# Patient Record
Sex: MALE | Race: BLACK OR AFRICAN AMERICAN
[De-identification: names, ages, dates, MRNs, and addresses within clinical notes are randomized per-mention and may not be internally consistent; named-entity substitution may affect disease eponyms.]

---

## 2017-06-06 ENCOUNTER — HOSPITAL ENCOUNTER (INPATIENT)
Dept: HOSPITAL 68 - ERH | Age: 57
LOS: 6 days | Discharge: HOME HEALTH | DRG: 282 | End: 2017-06-12
Attending: INTERNAL MEDICINE | Admitting: INTERNAL MEDICINE
Payer: COMMERCIAL

## 2017-06-06 VITALS — WEIGHT: 145 LBS | HEIGHT: 68 IN | BODY MASS INDEX: 21.98 KG/M2

## 2017-06-06 DIAGNOSIS — K85.90: Primary | ICD-10-CM

## 2017-06-06 DIAGNOSIS — E11.40: ICD-10-CM

## 2017-06-06 DIAGNOSIS — F17.210: ICD-10-CM

## 2017-06-06 DIAGNOSIS — G45.9: ICD-10-CM

## 2017-06-06 DIAGNOSIS — M48.02: ICD-10-CM

## 2017-06-06 DIAGNOSIS — Z79.4: ICD-10-CM

## 2017-06-06 DIAGNOSIS — F32.3: ICD-10-CM

## 2017-06-06 DIAGNOSIS — M50.21: ICD-10-CM

## 2017-06-06 DIAGNOSIS — N32.89: ICD-10-CM

## 2017-06-06 LAB
ABSOLUTE GRANULOCYTE CT: 4.6 /CUMM (ref 1.4–6.5)
BASOPHILS # BLD: 0 /CUMM (ref 0–0.2)
BASOPHILS NFR BLD: 0.3 % (ref 0–2)
EOSINOPHIL # BLD: 0.2 /CUMM (ref 0–0.7)
EOSINOPHIL NFR BLD: 3.4 % (ref 0–5)
ERYTHROCYTE [DISTWIDTH] IN BLOOD BY AUTOMATED COUNT: 13.5 % (ref 11.5–14.5)
GRANULOCYTES NFR BLD: 68.8 % (ref 42.2–75.2)
HCT VFR BLD CALC: 46 % (ref 42–52)
LYMPHOCYTES # BLD: 1.4 /CUMM (ref 1.2–3.4)
MCH RBC QN AUTO: 28.3 PG (ref 27–31)
MCHC RBC AUTO-ENTMCNC: 32.3 G/DL (ref 33–37)
MCV RBC AUTO: 87.7 FL (ref 80–94)
MONOCYTES # BLD: 0.4 /CUMM (ref 0.1–0.6)
PLATELET # BLD: 237 /CUMM (ref 130–400)
PMV BLD AUTO: 7.5 FL (ref 7.4–10.4)
RED BLOOD CELL CT: 5.24 /CUMM (ref 4.7–6.1)
WBC # BLD AUTO: 6.7 /CUMM (ref 4.8–10.8)

## 2017-06-06 PROCEDURE — 86618 LYME DISEASE ANTIBODY: CPT

## 2017-06-06 PROCEDURE — 2NASP: CPT

## 2017-06-06 PROCEDURE — 70551 MRI BRAIN STEM W/O DYE: CPT

## 2017-06-06 NOTE — HISTORY & PHYSICAL
LAMIN BEAULIEU 06/06/17 1713:
General Information and HPI
MD Statement:
I have seen and personally examined ITA VARNER and documented this H&P.
 
The patient is a 56 year old M who presented with a patient stated chief 
complaint of [Epigastric pain].
 
Source of Information: patient
Exam Limitations: no limitations
History of Present Illness:
Mr. Varner is a 55 yo man with PMHx. of schizophrenia, DM ,neuropathy, Hx. 
of pleural effusion at [facility] 1 year ago presented to emergency department 
with a chief complaint of epigastric pain.
pain is started up about 2 months ago, 9/10 in severity, constant pain, no 
radiation, he couldn't take it any more so he decided to come to emergency 
department for more evaluation, over the last 2 months when he eats he had the 
pain which caused him to through up, he also reports every day nausea, no 
appetite. He noticed that he had chills and becomes sweaty here at our emergency
department.
Patient denies weight loss, fever, chills, chest pain, shortness of breath, 
dizziness, heart tracing, and there is no change in urinary or bowel habits.
He denies alcohol dependence (he only drinks socially), he also denies using 
street drugs except for smoking marijuana beside that he is an active smoker of 
1 pack every 2 days since he was 10-year-old.
For his psychiatric problems and he is following with Psychiatrist is Sagar Mata
Diabetes managed by [facility] [[email or web address]] 
 
Allergies/Medications
Allergies:
Coded Allergies:
No Known Drug Allergies (Intermediate, NONE 06/06/17)
 
Home Med list
Atorvastatin Calcium 40 MG TABLET   1 TAB PO DAILY CHOLESTEROL  (Reported)
Bupropion HCl (Bupropion XL) 300 MG TAB.ER.24H   1 TAB PO DAILY MENTAL HEALTH  (
Reported)
Canagliflozin (Invokana) 300 MG TABLET   1 TAB PO DAILY DM  (Reported)
Clonazepam 1 MG TABLET   1 TAB PO QPM ANXIETY/SLEEP  (Reported)
Folic Acid 1 MG TABLET   1 TAB PO DAILY SUPPLEMENT  (Reported)
Gabapentin 300 MG CAPSULE   1 CAP PO BID MENTAL HEALTH  (Reported)
Insulin Glargine,Hum.rec.anlog (Toujeo Solostar) (Unknown Strength) INSULN.PEN  
(Unknown Dose) SC QMON DM  (Reported)
Insulin Lispro (Humalog Kwikpen U-100) 100 UNIT/ML INSULN.PEN   35 UNITS SC 
DAILY DM  (Reported)
Linaclotide (Linzess) 145 MCG CAPSULE   1 CAP PO DAILY GI  (Reported)
Lisinopril 2.5 MG TABLET   1 TAB PO DAILY BP  (Reported)
Risperidone 4 MG TABLET   1 TAB PO BID MENTAL HEALTH  (Reported)
Sitagliptin Phos/Metformin HCl (Janumet 50-1,000 MG Tablet) 50 MG-1,000 MG 
TABLET   1 TAB PO BID DM  (Reported)
Trazodone HCl 100 MG TABLET   2 TAB PO QHS MENTAL HEALTH  (Reported)
 
 
Past History
 
Travel History
Traveled to [place] past 21 day No
 
Medical History
Cardiovascular: NONE
Respiratory: Pleural effusion
Gastrointestinal: NONE
Hepatic: NONE
Renal: NONE
Musculoskeletal: NONE
Endocrine: diabetes
 
Surgical History
Surgical History: none
 
Past Family/Social History
 
Psychosocial History
Smoking Status: Current Everyday Smoker ([id number] since 9 yo)
ETOH Use: occasional use
Illicit Drug Use: marijuana
 
Functional Ability
ADLs
Independent: dressing, eating, toileting, bathing. 
 
Employment History
Employment Disability (for voices)
 
Review of Systems
 
Review of Systems
Constitutional:
Reports: chills. 
EENTM:
Reports: no symptoms. 
Cardiovascular:
Reports: no symptoms. 
Respiratory:
Reports: no symptoms. 
GI:
Reports: abdominal pain, nausea, vomiting. 
Genitourinary:
Reports: no symptoms. 
Musculoskeletal:
Reports: no symptoms. 
Skin:
Reports: no symptoms. 
Neurological/Psychological:
Reports: no symptoms. 
Hematologic/Endocrine:
Reports: no symptoms. 
All Other Systems: Reviewed and Negative
 
Exam & Diagnostic Data
Last 24 Hrs of Vital Signs/I&O
 Vital Signs
 
 
Date Time Temp Pulse Resp B/P B/P Pulse O2 O2 Flow FiO2
 
     Mean Ox Delivery Rate 
 
06/06 1844 98.4 78 20 139/80  99 Room Air  
 
06/06 1648 98.4 89 20 136/74  98   
 
06/06 1401 98.4 99 20 106/72  97 Room Air  
 
 
 Intake & Output
 
 
 06/06 1600 06/06 0800 06/06 0000
 
Intake Total 1000  
 
Output Total   
 
Balance 1000  
 
    
 
Intake, IV 1000  
 
Patient 160 lb  
 
Weight   
 
Weight Reported by Patient  
 
Measurement   
 
Method   
 
 
 
 
Physical Exam
General Appearance Alert, Oriented X3, Cooperative, No Acute Distress
Skin No Rashes, No Breakdown, No Significant Lesion, there is multiple skin scar
on the chest area, he attribute it to injury
HEENT Atraumatic, PERRLA, EOMI
Cardiovascular Regular Rate, Normal S1, Normal S2, No Murmurs
Lungs Clear to Auscultation, Normal Air Movement
Abdomen Normal Bowel Sounds, Soft, Tenderness over the epigastric area
Neurological sensation decrease in B/L LE
Extremities No Edema, Normal Pulses
Vascular Normal Pulses, Pulses Symmetrical
Last 24 Hrs of Labs/Fer:
 Laboratory Tests
 
06/06/17 1720:
Lactic Acid 1.9
 
06/06/17 1421:
Anion Gap 14, Estimated GFR > 60, BUN/Creatinine Ratio 18.6, Glucose 299  H, 
Lactic Acid 2.9  H, Calcium 10.4  H, Total Bilirubin 0.6, AST 16  L, ALT 29, 
Alkaline Phosphatase 83, Total Protein 7.7, Albumin 4.5, Globulin 3.2, Albumin/
Globulin Ratio 1.4, Amylase 84, Lipase 864  H, CBC w Diff NO MAN DIFF REQ, RBC 
5.24, MCV 87.7, MCH 28.3, RDW 13.5, MPV 7.5, Gran % 68.8, Lymphocytes % 21.5, 
Monocytes % 6.0, Eosinophils % 3.4, Basophils % 0.3, Absolute Granulocytes 4.6, 
Absolute Lymphocytes 1.4, Absolute Monocytes 0.4, Absolute Eosinophils 0.2, 
Absolute Basophils 0, PUBS MCHC 32.3  L
 
 
Diagnostic Data
EKG Results
No EKG
Other Results
Abdomen/ pelvis CT:
IMPRESSION:
There are findings consistent with acute pancreatitis in the setting of
abdominal pain. There is no discrete pancreatic mass and no peripancreatic
collection. Of note there is an eccentric hyperdense lesion located along the
left lateral aspect of the urinary bladder. This finding may represent a clot
or urothelial neoplasm. Urological consultation with possible cystoscopy is
recommended.
 
Assessment/Plan
Assessment:
Mr. Varner is a 55 yo man with PMHx. of schizophrenia, DM ,neuropathy, Hx. 
of pleural effusion at [facility] 1 year ago presented to emergency department 
with a chief complaint of epigastric pain.  Admitted for acute pancreatitis
 
Corrected calcium is 10 mg/dL sites unlikely to be the cause of pancreatitis
 
Assessment:
#Acute pancreatitis
#Hyperdense lesion located along the left lateral aspect of the urinary bladder 
which could represent clots or neoplasm. 
#History of diabetes
#History of schizophrenia
 
Plan:
-We'll admit the patient to general medicine floor
-Nothing by mouth
-We'll hydrate with lactated ringer at 150 per hour, as currently his pain is 
improving
-We'll hold his oral hypoglycemic medication such as Janumet, as it can cause 
pancreatitis
-We'll check lactate dehydrogenase (to best assess Holbrook's criteria)
-IV Protonix
-Zofran IV as needed
-LFT is within normal limits, CAT scan results already obtained.  At this point 
in canal hold on ordering limited abdominal ultrasound to assess for gallbladder
stone
-Accu-Chek
-Insulin sliding scale
-Will check HbA1c
-He mentioned that he used long acting insulin as needed, and he couldn't 
remember the dose, that's need to be confirmed
-No peripancreatic collection, no need for antibiotic at this point
-Pain management with morphine 2 mg every 4 when necessary for severe pain
 
He is a full code
DVT prophylaxis with SC Lovenox
 
As Ranked By This Provider
Problem List:
 1. Pancreatitis, acute
   Qualifiers
 Pancreatitis type: unspecified pancreatitis type Acute pancreatitis 
complication: no infection or necrosis Qualified Code: K85.90 - Acute 
pancreatitis without necrosis or infection, unspecified
 
 
Core Measures/Miscellaneous
 
Acute Coronary Syndrome
ACS Diagnosis: No
 
Cerebrovascular Accident
CVA/TIA Diagnosis: No
 
Congestive Heart Failure
CHF Diagnosis: No
 
VTE (View Protocol)
VTE Risk Factors: Acute medical illness, Age > 40
No [facility] VTE prophylaxis d/t: No contraindications
No VTE Pharm Prophylaxis d/t: No contraindications
VTE Diagnosis: No
VTE Type: NONE
VTE Confirmed by (Test): NONE
 
Sepsis (View Protocol)
Severe Sepsis Present: No
 
Septic Shock
Septic Shock Present: No
 
Miscellaneous Documentation
Attending Case Discussed With:
SAYDA SHIPMAN MD
 
Primary Care Physician:
UNKNOWN
 
Patient sees these Specialists
-
Level of Patient Care: General Medicine
 
 
SAYDA SHIPMAN MD 06/06/17 4826:
Attending MD Review Statement
 
Attending Statement
Attending MD Statement: examined this patient, discuss w/resident/PA/NP, agreed 
w/resident/PA/NP, reviewed EMR data (avail)
Attending Assessment/Plan:
56M PMH HTN, schizophrenia presenting with acute onset of epigastric pain, 
nausea, vomiting with elevated lipase and evidence of acute pancreatitis on CT. 
No new medications, no alcohol abuse.  Hemodynamically stable, normal renal 
function.  Will admit to general medicine, pain control, IV hydration, advance 
diet as tolerated, review home medications for potential causes, DVT PPx

## 2017-06-06 NOTE — CT SCAN REPORT
EXAMINATION:
CT ABDOMEN AND PELVIS WITH CONTRAST
 
CLINICAL INFORMATION:
Abdominal pain. Diarrhea. Vomiting.
 
COMPARISON:
No relevant prior imaging available.
 
TECHNIQUE:
Multidetector volumetric imaging was performed of the abdomen and pelvis
before and after the IV administration of 95 mL of Optiray 320 intravenous
contrast. Sagittal and coronal reformatted images were obtained on the
technologist's workstation.
 
DLP:
279 mGy-cm
 
FINDINGS:
 
LUNG BASES: Lung bases are clear. There is no pleural or pericardial
effusion.
 
LIVER, GALLBLADDER, AND BILIARY TREE: Liver attenuation is homogeneous and
there is no evidence of a discrete hepatic parenchymal mass. The gallbladder
is normal. There is no intrahepatic or extrahepatic biliary ductal
dilatation.
 
PANCREAS: There is mild nonspecific stranding within the peripancreatic fat.
Otherwise no evidence of a discrete pancreatic mass or peripancreatic
collection.
 
SPLEEN: Unremarkable.
 
ADRENAL GLANDS: Unremarkable.
 
KIDNEYS AND URETERS: Kidneys demonstrate symmetric corticomedullary
enhancement. There is no discrete renal parenchymal mass. No abnormal
perinephric inflammation or collection. There is no hydroureteronephrosis. No
worrisome mass or calcification is visualized along the expected course of
the right or left ureter.
 
BLADDER: There is an eccentric hyperdense lesion located along the left
lateral aspect of the urinary bladder near the trigone best illustrated on
axial image 72 of 91 series 2. This finding may represent a blood clot or a
urothelial neoplasm.
 
GASTROINTESTINAL TRACT: There is a tiny hiatal hernia. The stomach and small
bowel are otherwise unremarkable. The appendix is normal. Colon is normal.
There is no free intraperitoneal air or fluid.
 
ABDOMINAL WALL: The abdominal wall is intact. Lobulated superficial
thickening of the anterior skin in the lower thoracic region that may
represent hypertrophic scar versus keloid.
 
LYMPH NODES: No pathologically enlarged mesenteric or retroperitoneal lymph
nodes.
 
VASCULAR: The abdominal aorta and inferior vena cava are unremarkable.
 
PELVIC VISCERA: Unremarkable.
 
OSSEOUS STRUCTURES: There is no acute osseous finding. There is moderate
degenerative arthrosis of both hips and advanced degenerative spondylosis at
L5-S1. No worrisome lytic or blastic osseous lesions.
 
IMPRESSION:
There are findings consistent with acute pancreatitis in the setting of
abdominal pain. There is no discrete pancreatic mass and no peripancreatic
collection. Of note there is an eccentric hyperdense lesion located along the
left lateral aspect of the urinary bladder. This finding may represent a clot
or urothelial neoplasm. Urological consultation with possible cystoscopy is
recommended.

## 2017-06-06 NOTE — NUR
**CRITICAL TEST RESULTS**
 
[id number] ITA VARNER 56 M
 
TESTS AND RESULTS: LACTIC 2.9
 
Results received and read back by:   ROLAND ANGEL
   Results received date and time:   06/06/17 7267
 
The following provider was notified of the results, and read the results back:
                       DR. SAEZ
       
Notified date and time:  06/06/17 at 7530

## 2017-06-06 NOTE — ED GI/GU/ABDOMINAL COMPLAINT
History of Present Illness
 
General
Chief Complaint: Abdominal Pain/Flank Pain
Stated Complaint: ABD PAIN AND VOMTING
Source: patient
Exam Limitations: no limitations
Allergies
Coded Allergies:
No Known Drug Allergies (Intermediate, NONE 06/06/17)
 
Reconcile Medications
Aspirin (Aspirin*) 81 MG TAB.CHEW   81 MG PO DAILY HEART HEALTH
Atorvastatin Calcium 40 MG TABLET   1 TAB PO DAILY CHOLESTEROL  (Reported)
Bupropion HCl (Bupropion XL) 300 MG TAB.ER.24H   1 TAB PO DAILY MENTAL HEALTH  (
Reported)
Canagliflozin (Invokana) 300 MG TABLET   1 TAB PO DAILY DM  (Reported)
Clonazepam 1 MG TABLET   1 TAB PO QPM ANXIETY/SLEEP  (Reported)
Folic Acid 1 MG TABLET   1 TAB PO DAILY SUPPLEMENT  (Reported)
Gabapentin 300 MG CAPSULE   1 CAP PO BID MENTAL HEALTH  (Reported)
Insulin Glargine,Hum.rec.anlog (Toujeo Solostar) (Unknown Strength) INSULN.PEN  
30 UNITS SC QMON DM  (Reported)
Insulin Lispro (Humalog Kwikpen U-100) 100 UNIT/ML INSULN.PEN   5 UNITS SC TIDAC
DM  (Reported)
Linaclotide (Linzess) 145 MCG CAPSULE   1 CAP PO DAILY GI  (Reported)
Lisinopril 2.5 MG TABLET   1 TAB PO DAILY BP  (Reported)
Risperidone 4 MG TABLET   1 TAB PO BID MENTAL HEALTH  (Reported)
Sitagliptin Phos/Metformin HCl (Janumet 50-1,000 MG Tablet) 50 MG-1,000 MG 
TABLET   1 TAB PO BID DM  (Reported)
Trazodone HCl 100 MG TABLET   2 TAB PO QHS MENTAL HEALTH  (Reported)
 
Triage Note:
C/O MID ABDOMINAL PAIN WITH VOMITING AND DIAARHEA
 X 1 WEEK.
Triage Nurses Notes Reviewed? yes
HPI:
Patient presents for evaluation of a midepigastric abdominal pain that began 
gradually over the past 2 weeks.  Patient states he's had intermittent severe 
sharp abdominal pains along with vomiting and nonbloody watery brown diarrhea.  
Patient states he takes a laxative from time to time and had continued to do so 
up until he began having diarrhea.
(SE BRADLEY,DARRELL PAGE)
 
Vital Signs & Intake/Output
Vital Signs & Intake/Output
 Vital Signs
 
 
Date Time Temp Pulse Resp B/P B/P Pulse O2 O2 Flow FiO2
 
     Mean Ox Delivery Rate 
 
06/12 1001       Room Air  
 
06/12 0640 97.8 80 16 124/76  95 Room Air  
 
06/12 0600 98.8 80 18 120/70     
 
06/12 0400 98.8 80 18 120/70     
 
06/12 0200 98.8 80 18 120/70     
 
06/12 0000 98.8 80 18 120/70     
 
06/11 2249 98.8 80 20 120/70  95 Room Air  
 
 
 ED Intake and Output
 
 
 06/12 0000 06/11 1200
 
Intake Total 900 
 
Output Total 1350 1325
 
Balance -450 -1325
 
   
 
Intake, Oral 900 
 
Output, Urine 1350 1325
 
 
 
Past History
 
Travel History
Traveled to [place] past 21 day No
 
Medical History
Any Pertinent Medical History? see below for history
Endocrine: diabetes
 
Surgical History
Surgical History: non-contributory
 
Psychosocial History
What is your primary language English
Tobacco Use: Never used
ETOH Use: occasional use
 
Family History
Hx Contributory? No
(SE BRADLEY,DARRELL PAGE)
 
Review of Systems
 
Review of Systems
Constitutional:
Reports: no symptoms. 
EENTM:
Reports: no symptoms. 
Respiratory:
Reports: no symptoms. 
Cardiovascular:
Reports: no symptoms. 
GI:
Reports: see HPI. 
Genitourinary:
Reports: no symptoms. 
Musculoskeletal:
Reports: no symptoms. 
Skin:
Reports: no symptoms. 
Neurological/Psychological:
Reports: no symptoms. 
Hematologic/Endocrine:
Reports: no symptoms. 
Immunologic/Allergic:
Reports: no symptoms. 
All Other Systems: Reviewed and Negative
(SE BRADLEY,DARRELL PAGE)
 
Physical Exam
 
Physical Exam
Gastrointestinal: SEE BELOW
Comments:
Gen.: Well-nourished, well-developed, no acute respiratory distress.
Head: Normocephalic, atraumatic.
Eyes: Normal inspection bilaterally
Ears: Normal inspection bilaterally
Nose: Normal inspection
Throat/mouth : Moist mucosa 
Neck: Supple, full range of motion, no goiter
Heart: Regular rate and rhythm, no murmurs rubs or gallops
Lungs: Clear to auscultation bilaterally with normal air entry
Chest: Nontender
Back: Normal range of motion
Abdomen: Soft, epigastric and periumbilical tenderness with preforms or guarding
but no rebound, nondistended, normal bowel sounds
Extremities: Normal range of motion grossly, equal radial pulses, no cyanosis 
clubbing or edema
Neurologic: Cranial nerves grossly intact, speech is clear
Skin: warm and dry
Psychiatric: Calm, cooperative, no apparent delusions or hallucinations
 
 
Core Measures
ACS in differential dx? No
Severe Sepsis Present: No
Septic Shock Present: No
(SE BRADLEY,DARRELL PAGE)
 
Progress
Differential Diagnosis: diverticulitis, ischemic bowel, inflamm bowel dis, 
pancreatitis
Initial ED EKG: none
Comments:
1414: Patient signed out to Dr. Raphael.
(SE BRADLEY,DARRELL PAGE)
Plan of Care:
 Orders
 
 
Procedure Date/time Status
 
Gait  Training 06/12  UNK Complete
 
Discharge Patient 06/12  UNK Active
 
 
Diagnostic Imaging:
Viewed by Me: CT Scan.  Discussed w/RAD: CT Scan. 
Radiology Impression: There are findings consistent with acute pancreatitis in 
the setting of abdominal pain. There is no discrete pancreatic mass and no 
peripancreatic collection. Of note there is an eccentric hyperdense lesion 
located along the left lateral aspect of the urinary bladder. This finding may 
represent a clot or urothelial neoplasm. Urological consultation with possible 
cystoscopy is recommended.
(OZ RAPHAEL MD)
 
Departure
 
Departure
Condition: Stable
Referrals:
UNKNOWN (PCP/Family)
 
Departure Forms:
Customer Survey
General Discharge Information
Prescriptions:
Current Visit Scripts
Aspirin (Aspirin*) 81 MG PO DAILY  
     #60 TAB 
 
 
(SE BRADLEY,DARRELL PAGE)
 
Departure
Time of Disposition: 1706
Disposition: HOME OR SELF CARE
Clinical Impression
Primary Impression: Pancreatitis, acute
Qualifiers:  Pancreatitis type: unspecified pancreatitis type Acute pancreatitis
complication: no infection or necrosis Qualified Code: K85.90 - Acute 
pancreatitis without necrosis or infection, unspecified
 
Admission Note
Spoke With:
SAYDA SHIPMAN MD
Documentation of Exam:
Documentation of any treatments & extenuating circumstances including Concerns 
Regarding Discharge (functional status, medication knowledge or non-compliance, 
living conditions, etc.) that warrant an admission rather than observation: NPO 
IV hydration IV antiemetic IV analgesia GI evaluation medication adjustment 
continuing care discharge planning
 
 
PA/NP Co-Sign Statement
Statement:
ED Attending supervision documentation-
 
x I saw and evaluated the patient. I have also reviewed all the pertinent lab 
results and diagnostic results. I agree with the findings and the plan of care 
as documented in the PA's/NP's documentation. 
 
[] I have reviewed the ED Record and agree with the PA's/NP's documentation.
 
[] Additions or exceptions (if any) to the PAs/NP's note and plan are 
summarized below:
[]
 
(OZ RAPHAEL MD)

## 2017-06-06 NOTE — NUR
PT C/O MID ABDOMINAL PAIN SINCE THIS AM. STATES HE IS +N/V. STATES PAIN IS NOW
A 9/10. DENIES ANY PAIN ANYWHERE ELSE. DENIES ANY FEVER.

## 2017-06-07 VITALS — SYSTOLIC BLOOD PRESSURE: 110 MMHG | DIASTOLIC BLOOD PRESSURE: 70 MMHG

## 2017-06-07 VITALS — SYSTOLIC BLOOD PRESSURE: 112 MMHG | DIASTOLIC BLOOD PRESSURE: 62 MMHG

## 2017-06-07 VITALS — DIASTOLIC BLOOD PRESSURE: 60 MMHG | SYSTOLIC BLOOD PRESSURE: 100 MMHG

## 2017-06-07 VITALS — DIASTOLIC BLOOD PRESSURE: 62 MMHG | SYSTOLIC BLOOD PRESSURE: 100 MMHG

## 2017-06-07 LAB
ABSOLUTE GRANULOCYTE CT: 2.7 /CUMM (ref 1.4–6.5)
BASOPHILS # BLD: 0 /CUMM (ref 0–0.2)
BASOPHILS NFR BLD: 0.5 % (ref 0–2)
EOSINOPHIL # BLD: 0.1 /CUMM (ref 0–0.7)
EOSINOPHIL NFR BLD: 2.7 % (ref 0–5)
ERYTHROCYTE [DISTWIDTH] IN BLOOD BY AUTOMATED COUNT: 13.7 % (ref 11.5–14.5)
GRANULOCYTES NFR BLD: 57.9 % (ref 42.2–75.2)
HCT VFR BLD CALC: 40.7 % (ref 42–52)
LYMPHOCYTES # BLD: 1.5 /CUMM (ref 1.2–3.4)
MCH RBC QN AUTO: 28.4 PG (ref 27–31)
MCHC RBC AUTO-ENTMCNC: 32.6 G/DL (ref 33–37)
MCV RBC AUTO: 87.1 FL (ref 80–94)
MONOCYTES # BLD: 0.3 /CUMM (ref 0.1–0.6)
PLATELET # BLD: 196 /CUMM (ref 130–400)
PMV BLD AUTO: 7.4 FL (ref 7.4–10.4)
RED BLOOD CELL CT: 4.67 /CUMM (ref 4.7–6.1)
WBC # BLD AUTO: 4.7 /CUMM (ref 4.8–10.8)

## 2017-06-07 NOTE — NUR
PT'S BP 0020 74/44. RECHECKED 75/50. MD NOTIFIED. LR ORDERED MOVED UP TO
200ML/HR FROM PREVIOUS 150 ML/HR. PT'S RECURRING BPS UP TO 92 SBP. CURRENT BP
89/56. MD NOTIFIED. AWAITING ORDERS

## 2017-06-07 NOTE — PN- ATT ADDEND
Attending Addendum
Attending Brief Note
Patient seen and examined, overall feeling better.  Abdominal pain has improved.
 He currently denies any nausea vomiting and he is feeling hungry. 
 
Vital Signs
 
 
Date Time Temp Pulse Resp B/P B/P Pulse O2 O2 Flow FiO2
 
     Mean Ox Delivery Rate 
 
06/07 0800 96.7 73 20 100/62  98 Room Air  
 
06/07 0759 96.7 73 18 100/62  98 Room Air  
 
06/07 0020 95.4 72 18 74/44  98 Room Air  
 
06/06 2116 95.9 81 20 132/84  96 Room Air  
 
06/06 1844 98.4 78 20 139/80  99 Room Air  
 
06/06 1648 98.4 89 20 136/74  98   
 
06/06 1401 98.4 99 20 106/72  97 Room Air  
 
 
on exam; 
aox3, nad. 
cv; s1,s2, rrr
resp; clear
abd; soft, nt, bs+
ext; no edema
 
 Laboratory Tests
 
 
 06/07 06/06
 
 0625 1720
 
Chemistry  
 
  Sodium (137 - 145 mmol/L) 138 
 
  Potassium (3.5 - 5.1 mmol/L) 3.9 
 
  Chloride (98 - 107 mmol/L) 103 
 
  Carbon Dioxide (22 - 30 mmol/L) 25 
 
  Anion Gap (5 - 16) 9 
 
  BUN (9 - 20 mg/dL) 11 
 
  Creatinine (0.7 - 1.2 mg/dL) 0.7 
 
  Estimated GFR (>60 ml/min) > 60 
 
  BUN/Creatinine Ratio (7 - 25 %) 15.7 
 
  Hemoglobin A1c (4.2 - 5.8 %) 9.2  H 
 
  Lactic Acid (0.7 - 2.1 mmol/L)  1.9
 
  Phosphorus (2.5 - 4.5 mg/dL) 3.8 
 
  Total Bilirubin (0.2 - 1.3 mg/dL) 0.6 
 
  Direct Bilirubin (< 0.4 mg/dL) 0.2 
 
  AST (17 - 59 U/L) 14  L 
 
  ALT (21 - 72 U/L) 33 
 
  Alkaline Phosphatase (< 127 U/L) 65 
 
  Total Protein (6.3 - 8.2 g/dL) 6.2  L 
 
  Albumin (3.5 - 5.0 g/dL) 3.4  L 
 
  Triglycerides (<150 mg/dL) 52 
 
  Cholesterol (< 200 MG/DL) 108 
 
  LDL Cholesterol, Calc (65 - 129 mg/dL) 64  L 
 
  HDL Cholesterol (40 - 60 mg/dL) 34  L 
 
  Cholesterol/HDL Ratio (0.00 - 4.88 %) 3 
 
  Amylase (30 - 110 U/L) 49 
 
  Lipase (23 - 300 U/L) 303  H 
 
Hematology  
 
  CBC w Diff NO MAN DIFF REQ 
 
  WBC (4.8 - 10.8 /CUMM) 4.7  L 
 
  RBC (4.70 - 6.10 /CUMM) 4.67  L 
 
  Hgb (14.0 - 18.0 G/DL) 13.3  L 
 
  Hct (42 - 52 %) 40.7  L 
 
  MCV (80.0 - 94.0 FL) 87.1 
 
  MCH (27.0 - 31.0 PG) 28.4 
 
  RDW (11.5 - 14.5 %) 13.7 
 
  Plt Count (130 - 400 /CUMM) 196 
 
  MPV (7.4 - 10.4 FL) 7.4 
 
  Gran % (42.2 - 75.2 %) 57.9 
 
  Lymphocytes % (20.5 - 51.1 %) 31.6 
 
  Monocytes % (1.7 - 9.3 %) 7.3 
 
  Eosinophils % (0 - 5 %) 2.7 
 
  Basophils % (0.0 - 2.0 %) 0.5 
 
  Absolute Granulocytes (1.4 - 6.5 /CUMM) 2.7 
 
  Absolute Lymphocytes (1.2 - 3.4 /CUMM) 1.5 
 
  Absolute Monocytes (0.10 - 0.60 /CUMM) 0.3 
 
  Absolute Eosinophils (0.0 - 0.7 /CUMM) 0.1 
 
  Absolute Basophils (0.0 - 0.2 /CUMM) 0 
 
  PUBS MCHC (33.0 - 37.0 G/DL) 32.6  L 
 
 
 
 
 06/06
 
 1421
 
Chemistry 
 
  Sodium (137 - 145 mmol/L) 136  L
 
  Potassium (3.5 - 5.1 mmol/L) 4.8
 
  Chloride (98 - 107 mmol/L) 96  L
 
  Carbon Dioxide (22 - 30 mmol/L) 27
 
  Anion Gap (5 - 16) 14
 
  BUN (9 - 20 mg/dL) 13
 
  Creatinine (0.7 - 1.2 mg/dL) 0.7
 
  Estimated GFR (>60 ml/min) > 60
 
  BUN/Creatinine Ratio (7 - 25 %) 18.6
 
  Glucose (65 - 99 mg/dL) 299  H
 
  Lactic Acid (0.7 - 2.1 mmol/L) 2.9  H
 
  Calcium (8.4 - 10.2 mg/dL) 10.4  H
 
  Total Bilirubin (0.2 - 1.3 mg/dL) 0.6
 
  AST (17 - 59 U/L) 16  L
 
  ALT (21 - 72 U/L) 29
 
  Alkaline Phosphatase (< 127 U/L) 83
 
  Lactate Dehydrogenase (313 - 618 U/L) 411
 
  Total Protein (6.3 - 8.2 g/dL) 7.7
 
  Albumin (3.5 - 5.0 g/dL) 4.5
 
  Globulin (1.9 - 4.2 gm/dL) 3.2
 
  Albumin/Globulin Ratio (1.1 - 2.2 %) 1.4
 
  Amylase (30 - 110 U/L) 84
 
  Lipase (23 - 300 U/L) 864  H
 
Hematology 
 
  CBC w Diff NO MAN DIFF REQ
 
  WBC (4.8 - 10.8 /CUMM) 6.7
 
  RBC (4.70 - 6.10 /CUMM) 5.24
 
  Hgb (14.0 - 18.0 G/DL) 14.9
 
  Hct (42 - 52 %) 46.0
 
  MCV (80.0 - 94.0 FL) 87.7
 
  MCH (27.0 - 31.0 PG) 28.3
 
  RDW (11.5 - 14.5 %) 13.5
 
  Plt Count (130 - 400 /CUMM) 237
 
  MPV (7.4 - 10.4 FL) 7.5
 
  Gran % (42.2 - 75.2 %) 68.8
 
  Lymphocytes % (20.5 - 51.1 %) 21.5
 
  Monocytes % (1.7 - 9.3 %) 6.0
 
  Eosinophils % (0 - 5 %) 3.4
 
  Basophils % (0.0 - 2.0 %) 0.3
 
  Absolute Granulocytes (1.4 - 6.5 /CUMM) 4.6
 
  Absolute Lymphocytes (1.2 - 3.4 /CUMM) 1.4
 
  Absolute Monocytes (0.10 - 0.60 /CUMM) 0.4
 
  Absolute Eosinophils (0.0 - 0.7 /CUMM) 0.2
 
  Absolute Basophils (0.0 - 0.2 /CUMM) 0
 
  PUBS MCHC (33.0 - 37.0 G/DL) 32.3  L
 
 
A/p; 55 y/o M with pmh sig for  schizophrenia, DM ,neuropathy, admitted with 
epigastric pain, acute pancraetitis, also imaging studies consistent with 
eccentric hyperdense lesion located along the left lateral aspect of the urinary
bladder. This finding may represent a clot or urothelial neoplasm.
 
At this point patient will be started on clear liquid diet and his diet can be 
advanced slowly as he tolerates. Continue IV fluids.  He needs a urology 
consult.  His blood sugars are now rising, please confirm the dose of his home 
dose insulin and started at half the dose for now for Levemir.
Continue sliding scale insulin.  PPI was added.
DVT px: Lovenox.

## 2017-06-07 NOTE — NUR
PT UP TO FLOOR AT 1905. PT ON RA, VSS. PT C/O SEVERE PAIN TO MID ABD. PAIN MED
GIVEN PER EMAR. IVF RUNNING AT 100ML/HR LR  RH. ABD SOFT. NO BREAKDOWN
NOTED. CALL BELL USE INSTRUCTED. PT OOBW STEADY GAIT UPON FLOOR ARRIVAL. WILL
MONITOR

## 2017-06-07 NOTE — PN- HOUSESTAFF
Subjective
Follow-up For:
Pancreatitis
Subjective:
was seen and examined this morning, like comfortably in bed, denied any chest 
pain, shortness of breath.  Patient denied any abdominal pain, nausea or 
vomiting.  Reported history of diarrhea, last diarrheal episode was on Thursday 
night.  Denied any dysuria, change in color of urine.
 
Review of Systems
Constitutional:
Reports: see HPI. 
 
Objective
Last 24 Hrs of Vital Signs/I&O
 Vital Signs
 
 
Date Time Temp Pulse Resp B/P B/P Pulse O2 O2 Flow FiO2
 
     Mean Ox Delivery Rate 
 
 0800 96.7 73 20 100/62  98 Room Air  
 
06/ 0759 96.7 73 18 100/62  98 Room Air  
 
06/ 0020 95.4 72 18 74/44  98 Room Air  
 
/ 2116 95.9 81 20 132/84  96 Room Air  
 
/ 1844 98.4 78 20 139/80  99 Room Air  
 
/ 1648 98.4 89 20 136/74  98   
 
 
 Intake & Output
 
 
 /07 1600 06/07 0800 06/ 0000
 
Intake Total 680 1175 
 
Output Total 600 300 
 
Balance 80 875 
 
    
 
Intake,  1175 
 
Intake, Oral 480  
 
Output, Urine 600 300 
 
Patient  65.771 kg 
 
Weight   
 
Weight  Reported by Patient 
 
Measurement   
 
Method   
 
 
 
 
Physical Exam
General Appearance: Alert, Oriented X3, Cooperative, No Acute Distress
Skin: No Rashes, No Breakdown, No Significant Lesion
Skin Temp/Moisture Exam: Warm/Dry
HEENT: Atraumatic, PERRLA, EOMI, Mucous Membr. moist/pink
Neck: Supple, No JVD
Cardiovascular: Regular Rate, Normal S1, Normal S2, No Murmurs
Lungs: Clear to Auscultation, Normal Air Movement
Abdomen: Normal Bowel Sounds, Soft, No Tenderness, No Hepatospenomegaly, No 
Masses
Neurological: Normal Gait, Normal Speech, Strength at 5/5 X4 Ext, Normal Tone, 
Sensation Intact, Cranial Nerves 3-12 NL, Reflexes 2+
Extremities: No Clubbing, No Cyanosis, No Edema, Normal Pulses, No Tenderness/
Swelling
 
Assessment/Plan
Assessment:
Mr. Sher is 56-year-old male with past medical history significant for 
major depressive disorder with psychotic feature, diabetes mellitus, neuropathy,
presented to ED with chief complain of epigastric pain for 2 month.  
 
Assessment:
#Acute pancreatitis
#Abnormal CT abdomen and pelvis findings suggestive for urinary bladder neoplasm
Hyperdense lesion located along the left lateral aspect of the urinary bladder 
which could represent clots or neoplasm. 
#Diabetes mellitus
#Major depressive disorder with psychotic features
 
 
#Acute pancreatitis
-Patient presented with history of epigastric pain for 2 month, no SIRS criteria
, BUN/creatinine within normal level, corrected calcium is 10 mg/dL, lipase was 
864, CT findings suggestive for acute pancreatitis BISAP score 0 
-Patient was kept nothing by mouth, optimal pain management and IV fluid Ringer 
lactate running at 200 mL/h
-Fluid was switched over night to normal saline given blood pressure of 70/50, 
improved
-Clinically patient improved significantly, no abdominal pain, nausea or 
vomiting, lipase 303
-Start diet to clear liquid, advance diet as tolerated
-Continue optimal pain management
-Ringer lactate fluid 100 mL per hour
-By reviewing patient's medication, no new medication was identified
-Lipid panel was obtained, triglycerides within normal level 52, total 
cholesterol 108, HDL 64, HDL 34
-Continue Protonix 40 daily
 
#Abnormal CT abdomen and pelvis findings suggestive for urinary bladder neoplasm
 
-CT abdomen and pelvis revealed Hyperdense lesion located along the left lateral
aspect of the urinary bladder which could represent clots or neoplasm
-Urology consultation Dr. Mc was obtained, thanks for recommendation
-Questionable urinary bladder cancer
-History of smoking 2 and have back per 2 days for at least 12 years
-We will obtain chest x-ray to rule out lung metastasis
-We'll obtain urine cytology
 
#Diabetes mellitus
-Patient is following with endocrinologist, was contacted to confirm medication
-Start levemir 15 daily, home dose 50 daily
-Accu check and NSS TIDAC medium dose 
 
#Major depressive disorder with psychotic features
-Patient psychiatric was contacted to confirm medication
-Continue clonazepam 1 mg Qpm
-Continue gabapentin 300 3 times a day
-Continue risperidone 4 mg twice a day
-Continue trazodone 200 mg Qpm 
-Continue bupropion 300 mg daily 
 
 
 
 
Code full
DVT prophylaxis with SC Lovenox
Diet clear liquid
Consultation urology
 
Problem List:
 1. Pancreatitis, acute
 
Pain Ratin
Pain Location:
epigastric pain 
Pain Goal: Pain 4 or less
Pain Plan:
severe pain pathway 
Tomorrow's Labs & Rationales:
CBC, BMP

## 2017-06-07 NOTE — NUR
CALLED REPORT TO [facility] ON GEN MED UNIT. PT TO BE BROUGHT TO ROOM 230-2. PT
AWAKE, ALERT, ORIENTED. ADMITS TO SOME ABDOMINAL PAIN 4 OUT OF 10 BUT REFUSING
OFFER OF PAIN MEDS. TAKING ONLY SMALL AMOUNTS OF CLEAR LIQUIDS. DENIES NAUSEA.
IV INFUSING AT 100CC HR LACTATED RINGERS. IV SITE WITHOUT REDNESS, OR SWELLING
OR PAIN. LUNGS CLEAR, HEART RATE REGULAR. PALPABLE PEDAL PULSES.

## 2017-06-08 VITALS — DIASTOLIC BLOOD PRESSURE: 74 MMHG | SYSTOLIC BLOOD PRESSURE: 114 MMHG

## 2017-06-08 VITALS — SYSTOLIC BLOOD PRESSURE: 152 MMHG | DIASTOLIC BLOOD PRESSURE: 82 MMHG

## 2017-06-08 VITALS — DIASTOLIC BLOOD PRESSURE: 80 MMHG | SYSTOLIC BLOOD PRESSURE: 122 MMHG

## 2017-06-08 LAB
ABSOLUTE GRANULOCYTE CT: 1.5 /CUMM (ref 1.4–6.5)
BASOPHILS # BLD: 0 /CUMM (ref 0–0.2)
BASOPHILS NFR BLD: 0.6 % (ref 0–2)
EOSINOPHIL # BLD: 0.2 /CUMM (ref 0–0.7)
EOSINOPHIL NFR BLD: 4.9 % (ref 0–5)
ERYTHROCYTE [DISTWIDTH] IN BLOOD BY AUTOMATED COUNT: 13.6 % (ref 11.5–14.5)
GRANULOCYTES NFR BLD: 47.1 % (ref 42.2–75.2)
HCT VFR BLD CALC: 38.7 % (ref 42–52)
LYMPHOCYTES # BLD: 1.3 /CUMM (ref 1.2–3.4)
MCH RBC QN AUTO: 28.5 PG (ref 27–31)
MCHC RBC AUTO-ENTMCNC: 32.5 G/DL (ref 33–37)
MCV RBC AUTO: 87.7 FL (ref 80–94)
MONOCYTES # BLD: 0.3 /CUMM (ref 0.1–0.6)
PLATELET # BLD: 174 /CUMM (ref 130–400)
PMV BLD AUTO: 7.8 FL (ref 7.4–10.4)
RED BLOOD CELL CT: 4.42 /CUMM (ref 4.7–6.1)
WBC # BLD AUTO: 3.3 /CUMM (ref 4.8–10.8)

## 2017-06-08 NOTE — PATIENT DISCHARGE INSTRUCTIONS
**See Addendum**
Discharge Instructions
 
General Discharge Information
You were seen/treated for:
pancreatitis 
Watch for these problems:
abdominal pain
Special Instructions:
1) please follow up with your primary care within one week
 
Diet
Continue normal diet: No
Recommended Diet: Diabetic
 
Acute Coronary Syndrome
 
Inclusion Criteria
At DC or during hospital stay patient has or had the following:
ACS DIAGNOSIS No
 
Discharge Core Measures
Meds if any: Prescribed or Continued at Discharge
Meds if any: NOT Prescribed or Continued at Discharge
 
Congestive Heart Failure
 
Inclusion Criteria
At DC or during hospital stay patient has or had the following:
CHF DIAGNOSIS No
 
Discharge Core Measures
Meds if any: Prescribed or Continued at Discharge
Meds if any: NOT Prescribed or Continued at Discharge
 
Cerebrovascular accident
 
Inclusion Criteria
At DC or during hospital stay patient has or had the following:
CVA/TIA Diagnosis No
 
Discharge Core Measures
Meds if any: Prescribed or Continued at Discharge
Meds if any: NOT Prescribed or Continued at Discharge
 
Venous thromboembolism
 
Inclusion Criteria
VTE Diagnosis No
VTE Type NONE
VTE Confirmed by (Test) NONE
 
Discharge Core Measures
- Per Current guidelines, there needs to be overlap
- treatment for the first 5 days of Warfarin therapy.
- If discharged on Warfarin prior to 5 days of
- overlap therapy, the patient will need to be
- assessed for post discharge needs including
- *Post discharge parental anticoagulation
- *Warfarin and/or parental anticoagulation education
- *Follow up date to check INR post discharge
At least 5 days overlap therapy as Inpatient No
Meds if any: Prescribed or Continued at Discharge
Note: Overlap Therapy is Warfarin and Anticoagulant
Meds if any: NOT Prescribed or Continued at Discharge

## 2017-06-08 NOTE — CONS- UROLOGY
General Information and HPI
 
Consulting Request
Date of Consult: 17
Requested By:
SAYDA SHIPMAN MD
 
Reason for Consult:
INCIDENTAL FINDING OF BLADDER MASS
Source of Information: patient, old records
Exam Limitations: poor historian
History of Present Illness:
56 YEAR OLD WITH SCHIZO. AND MULTIPLE MED. PROBLEMS: ADMITTED WITH SEVERE 
ABDOMINA PAIN AND ELEVATED PANCREATIC ENZYMES C/W PANCREATITIS.  PT STATES HE 
VOIDS EASILY WITHOUT ANY HX OF HEMATURIA. pT TOLD HE HAS A MASS IN BLADDER ON CT
SCAN THAT WILL NEED EVALUATION AND POSSIBILE REMOVAL IN THE FUTURE: PT STATED "I
DONT WANT NO CUTTING".  
 
Allergies/Medications
Allergies:
Coded Allergies:
No Known Drug Allergies (Intermediate, NONE 17)
 
Home Med List:
Atorvastatin Calcium 40 MG TABLET   1 TAB PO DAILY CHOLESTEROL  (Reported)
Bupropion HCl (Bupropion XL) 300 MG TAB.ER.24H   1 TAB PO DAILY MENTAL HEALTH  (
Reported)
Canagliflozin (Invokana) 300 MG TABLET   1 TAB PO DAILY DM  (Reported)
Clonazepam 1 MG TABLET   1 TAB PO QPM ANXIETY/SLEEP  (Reported)
Folic Acid 1 MG TABLET   1 TAB PO DAILY SUPPLEMENT  (Reported)
Gabapentin 300 MG CAPSULE   1 CAP PO BID MENTAL HEALTH  (Reported)
Insulin Glargine,Hum.rec.anlog (Toujeo Solostar) (Unknown Strength) INSULN.PEN  
30 UNITS SC QMON DM  (Reported)
Insulin Lispro (Humalog Kwikpen U-100) 100 UNIT/ML INSULN.PEN   5 UNITS SC TIDAC
DM  (Reported)
Linaclotide (Linzess) 145 MCG CAPSULE   1 CAP PO DAILY GI  (Reported)
Lisinopril 2.5 MG TABLET   1 TAB PO DAILY BP  (Reported)
Risperidone 4 MG TABLET   1 TAB PO BID MENTAL HEALTH  (Reported)
Sitagliptin Phos/Metformin HCl (Janumet 50-1,000 MG Tablet) 50 MG-1,000 MG 
TABLET   1 TAB PO BID DM  (Reported)
Trazodone HCl 100 MG TABLET   2 TAB PO QHS MENTAL HEALTH  (Reported)
 
Current Medications:
 Current Medications
 
 
  Sig/Devika Start time  Last
 
Medication Dose Route Stop Time Status Admin
 
Bupropion HCl 300 MG DAILY  1000 AC 
 
  PO   0841
 
Clonazepam 1 MG QPM  2200 AC 
 
  PO 
 
Dextrose 25 GM ONCE  DC 
 
  IV 06/08 0716  0720
 
Enoxaparin Sodium 40 MG DAILY  1000 AC 
 
  SC   0841
 
Folic Acid 1 MG DAILY  1000 AC 
 
  PO   0841
 
Gabapentin 300 MG BID  2200 AC 
 
  PO   0841
 
Insulin Aspart 0 TIDAC  0800 AC 
 
  SC   1643
 
Insulin Detemir 15 UNITS DAILY  1506 AC 
 
  SC   0840
 
Lactated Ringer's 1,000 ML Q10H  1515 DC 
 
  IV   1547
 
Morphine Sulfate 2 MG Q4P PRN  1930 DC 
 
  IV   1902
 
Ondansetron HCl 4 MG Q6P PRN  2030 AC 
 
  IV   
 
Pantoprazole Sodium 40 MG DAILY  1000 AC 
 
  IV   0841
 
Patient Medication  1 ED .STK-MED ONE  1349 NH 
 
Teaching  ED  1350  
 
Risperidone 4 MG BID  1432  
 
  PO   0840
 
Tramadol HCl 50 MG Q6P PRN  1615 AC 
 
  PO   
 
Trazodone HCl 200 MG AT BEDTIME  2200 AC 
 
  PO   2117
 
 
 
 
Past History
 
Medical History
Cardiovascular: NONE
Respiratory: Pleural effusion
Gastrointestinal: NONE
Hepatic: NONE
Renal: NONE
Musculoskeletal: NONE
Psychiatric: schizophrenia
Endocrine: diabetes
 
Surgical History
Pertinent Surgical History: 1
 
Psychosocial History
Where Do You Live? Home
Smoking Status: Current Everyday Smoker ([id number] since 9 yo)
ETOH Use: occasional use
Illicit Drug Use: marijuana
 
Functional Ability
ADLs
Independent: dressing, eating, toileting, bathing. 
 
Employment History
Employment: Disability (for voices)
Retired? unknown
 
Review of Systems
 
Review of Systems
Constitutional:
Reports: see HPI. 
EENTM:
Denies: no symptoms. 
Cardiovascular:
Denies: no symptoms. 
Respiratory:
Denies: no symptoms. 
GI:
Reports: abdominal pain, bloating. 
Genitourinary:
Denies: no symptoms. 
Musculoskeletal:
Denies: no symptoms. 
Skin:
Denies: no symptoms. 
 
Exam & Diagnostic Data
Vital Signs and I&O
Vital Signs
 
 
Date Time Temp Pulse Resp B/P B/P Pulse O2 O2 Flow FiO2
 
     Mean Ox Delivery Rate 
 
 1459 98.3 85 20 122/80  98   
 
 0632 97.1 77 20 114/74  96 Room Air  
 
7 97.7 69 20 100/60  96 Room Air  
 
 1908 97.9 81 20 110/70  97 Room Air  
 
 
 Intake & Output
 
 
  1600  0800  0000  1600  0800  0000
 
Intake Total 1800 1040  
 
Output Total    600 300 
 
Balance 1800 1040 900 80 875 
 
       
 
Intake, IV  800  
 
Intake, Oral 1800 240 500 480  
 
Number  0    
 
Bowel      
 
Movements      
 
Output, Urine    600 300 
 
Patient     145 lb 
 
Weight      
 
Weight     Reported by Patient 
 
Measurement      
 
Method      
 
 
 
 
Physical Exam
General Appearance: well developed/nourished, no apparent distress
Head: atraumatic
Eyes:
Bilateral: normal appearance. 
Ears, Nose, Throat: normal pharynx
Neck: normal inspection, supple, full range of motion
Respiratory: normal breath sounds
Cardiovascular: regular rate/rhythm
Gastrointestinal: normal bowel sounds, soft, non-tender
Back: no vertebral tenderness
Extremities: normal inspection
Skin: intact, normal color, warm/dry
Lymphatic: NO ADENOPATHY BLT GROIN
Reproductive: Normal male genitalia
Last 24 Hours of Labs:
 Laboratory Tests
 
 
 0645
 
Chemistry 
 
  Sodium (137 - 145 mmol/L) 141
 
  Potassium (3.5 - 5.1 mmol/L) 3.9
 
  Chloride (98 - 107 mmol/L) 106
 
  Carbon Dioxide (22 - 30 mmol/L) 26
 
  Anion Gap (5 - 16) 8
 
  BUN (9 - 20 mg/dL) 6  L
 
  Creatinine (0.7 - 1.2 mg/dL) 0.6  L
 
  Estimated GFR (>60 ml/min) > 60
 
  BUN/Creatinine Ratio (7 - 25 %) 10.0
 
  Glucose (65 - 99 mg/dL) 53  L
 
Hematology 
 
  CBC w Diff NO MAN DIFF REQ
 
  WBC (4.8 - 10.8 /CUMM) 3.3  L
 
  RBC (4.70 - 6.10 /CUMM) 4.42  L
 
  Hgb (14.0 - 18.0 G/DL) 12.6  L
 
  Hct (42 - 52 %) 38.7  L
 
  MCV (80.0 - 94.0 FL) 87.7
 
  MCH (27.0 - 31.0 PG) 28.5
 
  RDW (11.5 - 14.5 %) 13.6
 
  Plt Count (130 - 400 /CUMM) 174
 
  MPV (7.4 - 10.4 FL) 7.8
 
  Gran % (42.2 - 75.2 %) 47.1
 
  Lymphocytes % (20.5 - 51.1 %) 39.1
 
  Monocytes % (1.7 - 9.3 %) 8.3
 
  Eosinophils % (0 - 5 %) 4.9
 
  Basophils % (0.0 - 2.0 %) 0.6
 
  Absolute Granulocytes (1.4 - 6.5 /CUMM) 1.5
 
  Absolute Lymphocytes (1.2 - 3.4 /CUMM) 1.3
 
  Absolute Monocytes (0.10 - 0.60 /CUMM) 0.3
 
  Absolute Eosinophils (0.0 - 0.7 /CUMM) 0.2
 
  Absolute Basophils (0.0 - 0.2 /CUMM) 0
 
  PUBS MCHC (33.0 - 37.0 G/DL) 32.5  L
 
 
 
Imaging Results:
PATIENT: ITA VARNER  MEDICAL RECORD NO: [id number]
PRESENT AGE: 56  PATIENT ACCOUNT NO: [id number]
: 56  LOCATION: [facility], [place]
ORDERING PHYSICIAN: DARRELL SAEZ MD  
 
  SERVICE DATE: 
EXAM TYPE: CAT - CT ABD & PELVIS W IV CONTRAST
 
EXAMINATION:
CT ABDOMEN AND PELVIS WITH CONTRAST
 
CLINICAL INFORMATION:
Abdominal pain. Diarrhea. Vomiting.
 
COMPARISON:
No relevant prior imaging available.
 
TECHNIQUE:
Multidetector volumetric imaging was performed of the abdomen and pelvis
before and after the IV administration of 95 mL of Optiray 320 intravenous
contrast. Sagittal and coronal reformatted images were obtained on the
technologist's workstation.
 
DLP:
279 mGy-cm
 
FINDINGS:
 
LUNG BASES: Lung bases are clear. There is no pleural or pericardial
effusion.
 
LIVER, GALLBLADDER, AND BILIARY TREE: Liver attenuation is homogeneous and
there is no evidence of a discrete hepatic parenchymal mass. The gallbladder
is normal. There is no intrahepatic or extrahepatic biliary ductal
dilatation.
 
PANCREAS: There is mild nonspecific stranding within the peripancreatic fat.
Otherwise no evidence of a discrete pancreatic mass or peripancreatic
collection.
 
SPLEEN: Unremarkable.
 
ADRENAL GLANDS: Unremarkable.
 
KIDNEYS AND URETERS: Kidneys demonstrate symmetric corticomedullary
enhancement. There is no discrete renal parenchymal mass. No abnormal
perinephric inflammation or collection. There is no hydroureteronephrosis. No
worrisome mass or calcification is visualized along the expected course of
the right or left ureter.
 
BLADDER: There is an eccentric hyperdense lesion located along the left
lateral aspect of the urinary bladder near the trigone best illustrated on
axial image 72 of 91 series 2. This finding may represent a blood clot or a
urothelial neoplasm.
 
GASTROINTESTINAL TRACT: There is a tiny hiatal hernia. The stomach and small
bowel are otherwise unremarkable. The appendix is normal. Colon is normal.
There is no free intraperitoneal air or fluid.
 
ABDOMINAL WALL: The abdominal wall is intact. Lobulated superficial
thickening of the anterior skin in the lower thoracic region that may
represent hypertrophic scar versus keloid.
 
LYMPH NODES: No pathologically enlarged mesenteric or retroperitoneal lymph
nodes.
 
VASCULAR: The abdominal aorta and inferior vena cava are unremarkable.
 
PELVIC VISCERA: Unremarkable.
 
OSSEOUS STRUCTURES: There is no acute osseous finding. There is moderate
degenerative arthrosis of both hips and advanced degenerative spondylosis at
L5-S1. No worrisome lytic or blastic osseous lesions.
 
IMPRESSION:
There are findings consistent with acute pancreatitis in the setting of
abdominal pain. There is no discrete pancreatic mass and no peripancreatic
collection. Of note there is an eccentric hyperdense lesion located along the
left lateral aspect of the urinary bladder. This finding may represent a clot
or urothelial neoplasm. Urological consultation with possible cystoscopy is
recommended.
 
 
Assessment/Plan
Assessment/Plan
BLADDER MASS-INCIDENTAL FINDING: PT TO HAVE CYSTOSCOPY ONCE MEDICALLY STABLE AND
WHEN PT IS AGREEABLE TO CYSTOSCOPY/TURBT
 
Copies To:
ISELA JAMES MD
 
Consult Acknowledgment
- Thank you for your consult request.
 
Attending MD Review Statement
 
Attending Statement
Attending MD Statement: examined this patient, discuss w/resident/PA/NP
Attending Assessment/Plan:
BLADDER MASS: RECOMMEND EVAL/TX WHEN MEDICAL STABLE AND IF/WHEN PT IS AGREEABLE 
TO SURGERY.  WILL FOLLOW.

## 2017-06-08 NOTE — NUR
NURSING NOTE: PATIENTS FSBS IS 52 AT THIS TIME. MD PATTY Campbell MADE AWARE, APPLE
JUICE GIVEN, AWAITING ORDER FOR DEXTROSE, RECHECK 30 MINS AFTER DEXTROSE GIVEN
PER MD. WILL CONT TO MONITOR.

## 2017-06-08 NOTE — NUR
NURSING NOTE: DEXTROSE GIVEN PER EMAR AND MD ORDERS AT THIS TIME. RECHECK FSBS
IN 15 MINUTES. DAY RN MELISA AT BEDSIDE GETTING REPORT, AWARE OF ABOVE. WILL
CONT TO MONITOR.

## 2017-06-08 NOTE — PN- HOUSESTAFF
BHAVANA BRADLEY,[facility] 17 0716:
Subjective
Follow-up For:
Pancreatitis
Subjective:
Patient was seen and examined this morning, alert, oriented to place and time, 
vital signs are stable, had one episode of hypoglycemia this morning and blood 
sugar improved after 25 gm dextrose.  Patient denied any abdominal pain, nausea 
or vomiting.  Tolerating diet well.
 
Review of Systems
Constitutional:
Reports: see HPI. 
 
Objective
Last 24 Hrs of Vital Signs/I&O
 Vital Signs
 
 
Date Time Temp Pulse Resp B/P B/P Pulse O2 O2 Flow FiO2
 
     Mean Ox Delivery Rate 
 
 1459 98.3 85 20 122/80  98   
 
/08 0632 97.1 77 20 114/74  96 Room Air  
 
 2237 97.7 69 20 100/60  96 Room Air  
 
 1908 97.9 81 20 110/70  97 Room Air  
 
 1650 98.0 82 20 112/62  100 Room Air  
 
 
 Intake & Output
 
 
  1600 06/08 0800 06/08 0000
 
Intake Total 1800 1040 900
 
Output Total   
 
Balance 1800 1040 900
 
    
 
Intake, IV  800 400
 
Intake, Oral 1800 240 500
 
Number  0 
 
Bowel   
 
Movements   
 
 
 
 
Physical Exam
General Appearance: Alert, Cooperative, No Acute Distress
Skin: No Rashes, No Breakdown, No Significant Lesion
Skin Temp/Moisture Exam: Warm/Dry
HEENT: Atraumatic, PERRLA, EOMI, Mucous Membr. moist/pink
Neck: Supple, No JVD
Cardiovascular: Regular Rate, Normal S1, Normal S2, No Murmurs
Lungs: Clear to Auscultation, Normal Air Movement
Abdomen: Normal Bowel Sounds, Soft, No Tenderness, No Hepatospenomegaly, No 
Masses
Neurological: Normal Speech, Strength at 5/5 X4 Ext, Normal Tone, Sensation 
Intact, Cranial Nerves 3-12 NL, Reflexes 2+
Extremities: No Clubbing, No Cyanosis, No Edema, Normal Pulses, No Tenderness/
Swelling
 
Assessment/Plan
Assessment:
Mr. Sher is 56-year-old male with past medical history significant for 
major depressive disorder with psychotic feature, diabetes mellitus, neuropathy,
presented to ED with chief complain of epigastric pain for 2 month.  
 
Assessment:
#Acute pancreatitis
#Abnormal CT abdomen and pelvis findings suggestive for urinary bladder neoplasm
Hyperdense lesion located along the left lateral aspect of the urinary bladder 
which could represent clots or neoplasm. 
#Diabetes mellitus
#Major depressive disorder with psychotic features
 
 
#Acute pancreatitis
-Patient presented with history of epigastric pain for 2 month, no SIRS criteria
, BUN/creatinine within normal level, corrected calcium is 10 mg/dL, lipase was 
864, CT findings suggestive for acute pancreatitis BISAP score 0 
-Patient was kept nothing by mouth, optimal pain management and IV fluid Ringer 
lactate running at 200 mL/h
-Received Ringer lactate IV fluid
-Clinically patient improved significantly, no abdominal pain, nausea or 
vomiting, lipase 303
-Diet was started yesterday, patient is tolerating clear liquid diet well, was 
advanced to diabetic diet 
-Continue optimal pain management, avoid opioid
-Discontinue IV fluid
-Lipid panel was obtained, triglycerides within normal level 52, total 
cholesterol 108, HDL 64, HDL 34
-Patient denied alcohol consumption, no history of new medication
-Continue Protonix 40 daily
 
#Abnormal CT abdomen and pelvis findings suggestive for urinary bladder neoplasm
 
-CT abdomen and pelvis revealed Hyperdense lesion located along the left lateral
aspect of the urinary bladder which could represent clots or neoplasm
-Urology consultation Dr. Mc was obtained, thanks for recommendation
-Questionable urinary bladder cancer
-History of smoking 2 and have back per 2 days for at least 12 years
-Chest x-ray didn't reveal any evidence of metastatic disease within the chest
-Urine cytology is pending
 
#Diabetes mellitus
-Patient is following with endocrinologist, medication was confirmed via 
endocrinologist and pharmacy
-Continue levemir 13 daily, home dose 50 daily
-Accu check and NSS TIDAC medium dose 
-CCD 2
 
#Major depressive disorder with psychotic features
-Patient psychiatric was contacted to confirm medication
-Continue clonazepam 1 mg Qpm
-Continue gabapentin 300 3 times a day
-Continue risperidone 4 mg twice a day
-Continue trazodone 200 mg Qpm 
-Continue bupropion 300 mg daily 
 
 
 
 
Code full
DVT prophylaxis with SC Lovenox
Diet CC2
Consultation urology
 
Problem List:
 1. Pancreatitis, acute
 
Pain Ratin
Pain Location:
NONE 
Pain Goal: Pain 4 or less
Pain Plan:
Moderate pain pathway 
Tomorrow's Labs & Rationales:
BMP, glucose 
 
 
TRENT BRADLEY,[facility] [place] 17 1135:
Attending MD Review Statement
 
Attending Statement
Attending MD Statement: examined this patient, discuss w/resident/PA/NP, agreed 
w/resident/PA/NP, reviewed EMR data (avail), discussed with nursing, discussed 
with case mgmt, reviewed images, amended to note
Attending Assessment/Plan:
Patient seen and examined, he was slightly confused this morning.  He denies any
pain.  His eye has been advanced and he has been tolerating it well so far. 
Patient was hypoglycemic this am and received amp of D50. 
 
Vital Signs
 
 
Date Time Temp Pulse Resp B/P B/P Pulse O2 O2 Flow FiO2
 
     Mean Ox Delivery Rate 
 
0632 97.1 77 20 114/74  96 Room Air  
 
 2237 97.7 69 20 100/60  96 Room Air  
 
 1908 97.9 81 20 110/70  97 Room Air  
 
 1650 98.0 82 20 112/62  100 Room Air  
 
 
on exam; 
awake, nad. 
cv; s1,s2, rrr
resp; clear
abd; soft, nt, bs+
ext; no edema. 
 
 Laboratory Tests
 
 
 645
 
Chemistry 
 
  Sodium (137 - 145 mmol/L) 141
 
  Potassium (3.5 - 5.1 mmol/L) 3.9
 
  Chloride (98 - 107 mmol/L) 106
 
  Carbon Dioxide (22 - 30 mmol/L) 26
 
  Anion Gap (5 - 16) 8
 
  BUN (9 - 20 mg/dL) 6  L
 
  Creatinine (0.7 - 1.2 mg/dL) 0.6  L
 
  Estimated GFR (>60 ml/min) > 60
 
  BUN/Creatinine Ratio (7 - 25 %) 10.0
 
  Glucose (65 - 99 mg/dL) 53  L
 
Hematology 
 
  CBC w Diff NO MAN DIFF REQ
 
  WBC (4.8 - 10.8 /CUMM) 3.3  L
 
  RBC (4.70 - 6.10 /CUMM) 4.42  L
 
  Hgb (14.0 - 18.0 G/DL) 12.6  L
 
  Hct (42 - 52 %) 38.7  L
 
  MCV (80.0 - 94.0 FL) 87.7
 
  MCH (27.0 - 31.0 PG) 28.5
 
  RDW (11.5 - 14.5 %) 13.6
 
  Plt Count (130 - 400 /CUMM) 174
 
  MPV (7.4 - 10.4 FL) 7.8
 
  Gran % (42.2 - 75.2 %) 47.1
 
  Lymphocytes % (20.5 - 51.1 %) 39.1
 
  Monocytes % (1.7 - 9.3 %) 8.3
 
  Eosinophils % (0 - 5 %) 4.9
 
  Basophils % (0.0 - 2.0 %) 0.6
 
  Absolute Granulocytes (1.4 - 6.5 /CUMM) 1.5
 
  Absolute Lymphocytes (1.2 - 3.4 /CUMM) 1.3
 
  Absolute Monocytes (0.10 - 0.60 /CUMM) 0.3
 
  Absolute Eosinophils (0.0 - 0.7 /CUMM) 0.2
 
  Absolute Basophils (0.0 - 0.2 /CUMM) 0
 
  PUBS MCHC (33.0 - 37.0 G/DL) 32.5  L
 
 
A/P; 55 y/o M with pmh sig for  schizophrenia, DM ,neuropathy, admitted with 
epigastric pain, acute pancraetitis, also imaging studies consistent with 
eccentric hyperdense lesion located along the left lateral aspect of the urinary
bladder. This finding may represent a clot or urothelial neoplasm.
 
From an acute enteritis standpoint, patient is improving and his diet has been 
advanced.  Awaiting neurological evaluation.  Urine cytology will be sent.  We 
tried to call his sister to find out what his baseline is. Dr. Day left a 
msg. 
Patient was started on half the dose of his levemir compared to home dose. Will 
monitor his blood sugars now that his diet has been advanced.  Repeat blood 
sugar after breakfast was 193.
His last dose of morphine was last night.
We'll try to avoid further narcotics.
Patient denies use of heavy alcohol.
Continue all other current medications.
DVT prophylaxis: Lovenox.  Once we clarify with the sister about his baseline, 
if this is his baseline he could be a possible discharge tomorrow if continues 
to tolerate diet and after urological evaluation.

## 2017-06-09 VITALS — DIASTOLIC BLOOD PRESSURE: 80 MMHG | SYSTOLIC BLOOD PRESSURE: 110 MMHG

## 2017-06-09 VITALS — DIASTOLIC BLOOD PRESSURE: 78 MMHG | SYSTOLIC BLOOD PRESSURE: 144 MMHG

## 2017-06-09 VITALS — SYSTOLIC BLOOD PRESSURE: 150 MMHG | DIASTOLIC BLOOD PRESSURE: 90 MMHG

## 2017-06-09 NOTE — NUR
PT'S BS IS CURRENTLY 500. CALLED DR. BHAVANA BRAMBILA. PER MD, TO GIVE PATIENT 5
EXTRA UNITS OF LEVEMIR IN ADDITION TO SLIDING SCALE OF NOVOLOG. WILL RECHECK
PATIENT'S BS IN 1 HOUR AND WILL FOLLOW UP CLOSELY. HE REMAINS ASYMPTOMATIC AT
THIS TIME. NO CHANGE IN MENTAL STATUS FROM AM ASSESSMENT.

## 2017-06-09 NOTE — MRI REPORT
MR BRAIN WITHOUT IV CONTRAST
 
CLINICAL INFORMATION:
Confusion and facial asymmetry.
 
COMPARISON:
None available.
 
TECHNIQUE:
MRI of the brain without contrast was obtained using routine sequences.
 
FINDINGS:
Motion degraded exam. 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
associated encephalomalacia and gliosis. There are T2 signal changes
throughout the supratentorial white matter in keeping with chronic
microangiopathy. There is parietal lobe predominant cerebral volume loss.
There is no hydrocephalus, extra-axial surface collection, or herniation. The
major flow voids at the skull base are preserved. No definite acute infarcts
are identified. The stroke sensitive diffusion series is limited by the
degree of motion artifact. There is no intracranial hemorrhage on the
gradient recalled echo acquisition. The midline structures are normal. The
cerebellar tonsils are normally positioned. The craniocervical junction is
normal. Osseous marrow signal intensity is homogenous. The visualized soft
tissues are unremarkable. At C3-C4, spondylytic changes result in mass effect
on the cervical cord and suspected moderate central canal stenosis.
 
IMPRESSION:
- This is a very motion degraded study. No definite acute infarcts are
identified. The stroke sensitive diffusion series is limited by the degree of
motion artifact.
 
- There is parietal lobe predominant cerebral volume loss.
 
- At C3-C4, spondylytic changes result in mass effect on the cervical cord
and suspected moderate central canal stenosis.

## 2017-06-09 NOTE — CONS- NEUROSURGICAL
General Information and HPI
 
Consulting Request
Date of Consult: 06/09/17
Requested By:
SAYDA SHIPMAN MD
 
Reason for Consult:
Incidental finding of cervical spinal stenosis in a patient without myelopathy
Source of Information: patient, EMR
Exam Limitations: confusion
History of Present Illness:
48-year-old right-handed -American male appealing older than stated age 
admitted for pancreatic disorders and bed to the diabetes was found to be 
confused
 
The workup of his confusion and MRI was obtained which shows some spondylosis of
the cervical spine
 
He has never had any difficulty ambulating of her myelopathic order he complains
of some neck pain for many years but has no complaint of radicular discomfort in
his arm or his legs
 
Allergies/Medications
Allergies:
Coded Allergies:
No Known Drug Allergies (Intermediate, NONE 06/06/17)
 
Home Med List:
Atorvastatin Calcium 40 MG TABLET   1 TAB PO DAILY CHOLESTEROL  (Reported)
Bupropion HCl (Bupropion XL) 300 MG TAB.ER.24H   1 TAB PO DAILY MENTAL HEALTH  (
Reported)
Canagliflozin (Invokana) 300 MG TABLET   1 TAB PO DAILY DM  (Reported)
Clonazepam 1 MG TABLET   1 TAB PO QPM ANXIETY/SLEEP  (Reported)
Folic Acid 1 MG TABLET   1 TAB PO DAILY SUPPLEMENT  (Reported)
Gabapentin 300 MG CAPSULE   1 CAP PO BID MENTAL HEALTH  (Reported)
Insulin Glargine,Hum.rec.anlog (Toujeo Solostar) (Unknown Strength) INSULN.PEN  
30 UNITS SC QMON DM  (Reported)
Insulin Lispro (Humalog Kwikpen U-100) 100 UNIT/ML INSULN.PEN   5 UNITS SC TIDAC
DM  (Reported)
Linaclotide (Linzess) 145 MCG CAPSULE   1 CAP PO DAILY GI  (Reported)
Lisinopril 2.5 MG TABLET   1 TAB PO DAILY BP  (Reported)
Risperidone 4 MG TABLET   1 TAB PO BID MENTAL HEALTH  (Reported)
Sitagliptin Phos/Metformin HCl (Janumet 50-1,000 MG Tablet) 50 MG-1,000 MG 
TABLET   1 TAB PO BID DM  (Reported)
Trazodone HCl 100 MG TABLET   2 TAB PO QHS MENTAL HEALTH  (Reported)
 
Current Medications:
 Current Medications
 
 
  Sig/Devika Start time  Last
 
Medication Dose Route Stop Time Status Admin
 
Bupropion HCl 300 MG DAILY 06/07 1000 AC 06/09
 
  PO   0842
 
Clonazepam 1 MG QPM 06/06 2200 AC 06/08
 
  PO 06/13 2159  2137
 
Enoxaparin Sodium 40 MG DAILY 06/07 1000 AC 06/09
 
  SC   0842
 
Folic Acid 1 MG DAILY 06/07 1000 AC 06/09
 
  PO   0843
 
Gabapentin 300 MG BID 06/06 2200 AC 06/09
 
  PO   0842
 
Insulin Aspart 0 AT BEDTIME 06/09 2200 AC 
 
  SC   
 
Insulin Aspart 0 TIDAC 06/07 0800 AC 06/09
 
  SC   1145
 
Insulin Detemir 20 UNITS DAILY 06/10 1000 DC 
 
  SC   
 
Insulin Detemir 25 UNITS DAILY 06/10 1000 AC 
 
  SC   
 
Insulin Detemir 5 UNITS ONCE ONE 06/09 1145 DC 06/09
 
  SC 06/09 1146  1145
 
Insulin Detemir 5 UNITS ONCE ONE 06/09 1000 DC 
 
  SC 06/09 1001  
 
Insulin Detemir 15 UNITS DAILY 06/07 1506 DC 06/09
 
  SC   0843
 
Lactated Ringer's 1,000 ML Q10H 06/07 1515 DC 06/08
 
  IV   1547
 
Morphine Sulfate 2 MG Q4P PRN 06/06 1930 DC 06/07
 
  IV   1902
 
Ondansetron HCl 4 MG Q6P PRN 06/06 2030 AC 
 
  IV   
 
Pantoprazole Sodium 40 MG DAILY 06/07 1000 AC 06/09
 
  IV   0842
 
Risperidone 4 MG BID 06/07 1432 AC 06/09
 
  PO   0842
 
Tramadol HCl 50 MG Q6P PRN 06/08 1615 AC 
 
  PO   
 
Trazodone HCl 200 MG AT BEDTIME 06/06 2200 AC 06/08
 
  PO   2137
 
 
 
 
Past History
 
Medical History
Blood Transfusion Hx: No
Neurological: altered mental status
EENT: NONE
Cardiovascular: NONE
Respiratory: Pleural effusion
Gastrointestinal: NONE
Hepatic: NONE
Renal: NONE
Musculoskeletal: NONE
Psychiatric: schizophrenia
Endocrine: diabetes
Blood Disorders: NONE
Cancer(s): NONE
Other Medical Hx:
Schizophrenia diabetes neuropathy pleural effusion
 
Surgical History
Pertinent Surgical History: unobtainable, 1
 
Psychosocial History
Where Do You Live? Home
Who Do You Live With? self
Services at Home: Occupational Therapy
Primary Language: English
Smoking Status: Current Everyday Smoker ([id number] since 11 yo)
ETOH Use: occasional use
Illicit Drug Use: marijuana
Living Will? unknown
Power of /HCP? unknown
Name of POA/HCP: unknown
Other Social History:
None
 
Functional Ability
ADLs
Independent: dressing, eating, toileting, bathing. 
Ambulation: walker
IADLs
Independent: telephone. 
 
Employment History
Employment: Disability (for voices)
Retired? unknown
 
Review of Systems
Review of Systems:
Some neck discomfort.  Complains many of his epigastric discomfort
 
Review of Systems
Constitutional:
Denies: see HPI. 
EENTM:
Reports: see HPI. 
Cardiovascular:
Denies: see HPI. 
Respiratory:
Denies: see HPI. 
GI:
Reports: see HPI. 
Genitourinary:
Denies: no symptoms. 
Musculoskeletal:
Reports: see HPI. 
Skin:
Denies: no symptoms. 
Neurological/Psychological:
Reports: see HPI. 
Hematologic/Endocrine:
Denies: no symptoms. 
Immunologic/Allergic:
Denies: no symptoms. 
All Other Systems: Reviewed and Negative
 
Exam & Diagnostic Data
Vital Signs and I&O
Vital Signs
 
 
Date Time Temp Pulse Resp B/P B/P Pulse O2 O2 Flow FiO2
 
     Mean Ox Delivery Rate 
 
06/09 1430 98.2 89 20 110/80  97 Room Air  
 
06/09 0629 98.3 98 18 144/78  92 Room Air  
 
06/08 2208 98.2 84 16 152/82  96 Room Air  
 
 
 Intake & Output
 
 
 06/09 1600 06/09 0800 06/09 0000 06/08 1600 06/08 0800 06/08 0000
 
Intake Total   1040 900
 
Output Total      
 
Balance   1040 900
 
       
 
Intake, IV  300   800 400
 
Intake, Oral   240 500
 
Number     0 
 
Bowel      
 
Movements      
 
Patient 145 lb     
 
Weight      
 
 
 
Physical Exam:
Appears stated age.  Awake and alert but disoriented to place.  Follows 
commands.  Minimal movement of the cervical spine.  No pronator drift.  No 
Johnathon's or increased finger flexors.  Sensory exam to both upper extremities 
within normal limits.  Trace reflexes upper extremities.  No evidence of clonus 
in either knee or ankle.  Decreased sensation compared to the upper extremities 
and lower extremities with good sensory touch.  Good strength.  Toes upgoing on 
the left probably secondary to old stroke
 
Physical Exam
General Appearance: no apparent distress
Head: atraumatic
Eyes:
Bilateral: PERRL. 
Ears, Nose, Throat: normal pharynx
Neck: normal inspection
Respiratory: normal breath sounds
Cardiovascular: regular rate/rhythm
Peripheral Pulses:
2+ carotid (R), 2+ carotid (L)
Gastrointestinal: soft
Rectal: deferred
Back: no vertebral tenderness
Extremities: normal inspection
Neurologic/Psych: awake, alert, no Johnathon's no clonus and Babinski on the left
Cranial Nerves: normal hearing
Reflexes:
0: knee (R), knee (L), ankle (R), ankle (L). 
Skin: intact
Lymphatic: no anterior cervical tricia
Reproductive: Normal male genitalia
Other Physical Findings:
n/a
Last 24 Hours of Labs:
 Laboratory Tests
 
 
 06/09
 
 0634
 
Chemistry 
 
  Sodium (137 - 145 mmol/L) 137
 
  Potassium (3.5 - 5.1 mmol/L) 4.0
 
  Chloride (98 - 107 mmol/L) 102
 
  Carbon Dioxide (22 - 30 mmol/L) 28
 
  Anion Gap (5 - 16) 6
 
  BUN (9 - 20 mg/dL) 6  L
 
  Creatinine (0.7 - 1.2 mg/dL) 0.8
 
  Estimated GFR (>60 ml/min) > 60
 
  BUN/Creatinine Ratio (7 - 25 %) 7.5
 
  Glucose (65 - 99 mg/dL) 254  H
 
 
 
Imaging Results:
MRI of today's spondylotic changes with minimal mass effect on the cervical cord
and very moderate central canal stenosis C3 4 spondylolisthesis with minimal 
mass effect on the cord at this
Other Results:
See HPI
 
Assessment/Plan
Assessment/Plan
This gentleman with multiple medical problems and schizophrenia has a incidental
finding of spondylosis at C3 4 and the absence of myelopathy or radiculopathy.  
Is absolutely no reason for neurosurgical intervention.  He should be seen by 
neurology for his change in mental status.  The remainder of his issues are non 
neurosurgical
Problem List:
 1. Pancreatitis, acute
 
 2. Bladder tumor
 
Other Findings/Comments:
None
Copies To:
SAYDA SHIPMAN MD
 
Consult Acknowledgment
- Thank you for your consult request.

## 2017-06-09 NOTE — CONS- NEUROLOGY
General Information and HPI
 
Consulting Request
Date of Consult: 06/09/17
Requested By:
SAYDA SHIPMAN MD
 
Reason for Consult:
Facial droop
Source of Information: patient
Exam Limitations: poor historian
History of Present Illness:
This is a 56 year old man with Schizophrenia who was admitted to the hospital 
for epigastric pain and was found to have acute pancreatitis. While here he was 
noted to have developed "a left facial droop and left sided weakness" and so we 
were called. He admits that last year he had gone to the ER for a bad headache 
and he was told he had a stroke. He took preventative meds but stopped taking 
them "because I don't believe in that". 
 
An MRI brain was obtained today, which I had reviewed. Although the quality of 
the MRI is poor there does not seem to be an acute infarct anywhere. There is 
evidence OF A PRIOR OLD INFARCT WITHIN THE LEFT OCCIPITAL TIP which is likely 
the stroke the patient is referring to. Otherwise there is a little bit of 
accelerated atropthy. 
 
Allergies/Medications
Allergies:
Coded Allergies:
No Known Drug Allergies (Intermediate, NONE 06/06/17)
 
Home Med List:
Atorvastatin Calcium 40 MG TABLET   1 TAB PO DAILY CHOLESTEROL  (Reported)
Bupropion HCl (Bupropion XL) 300 MG TAB.ER.24H   1 TAB PO DAILY MENTAL HEALTH  (
Reported)
Canagliflozin (Invokana) 300 MG TABLET   1 TAB PO DAILY DM  (Reported)
Clonazepam 1 MG TABLET   1 TAB PO QPM ANXIETY/SLEEP  (Reported)
Folic Acid 1 MG TABLET   1 TAB PO DAILY SUPPLEMENT  (Reported)
Gabapentin 300 MG CAPSULE   1 CAP PO BID MENTAL HEALTH  (Reported)
Insulin Glargine,Hum.rec.anlog (Toujeo Solostar) (Unknown Strength) INSULN.PEN  
30 UNITS SC QMON DM  (Reported)
Insulin Lispro (Humalog Kwikpen U-100) 100 UNIT/ML INSULN.PEN   5 UNITS SC TIDAC
DM  (Reported)
Linaclotide (Linzess) 145 MCG CAPSULE   1 CAP PO DAILY GI  (Reported)
Lisinopril 2.5 MG TABLET   1 TAB PO DAILY BP  (Reported)
Risperidone 4 MG TABLET   1 TAB PO BID MENTAL HEALTH  (Reported)
Sitagliptin Phos/Metformin HCl (Janumet 50-1,000 MG Tablet) 50 MG-1,000 MG 
TABLET   1 TAB PO BID DM  (Reported)
Trazodone HCl 100 MG TABLET   2 TAB PO QHS MENTAL HEALTH  (Reported)
 
Current Medications:
 Current Medications
 
 
  Sig/Devika Start time  Last
 
Medication Dose Route Stop Time Status Admin
 
Bupropion HCl 300 MG DAILY 06/07 1000 AC 06/09
 
  PO   0842
 
Clonazepam 1 MG QPM 06/06 2200 AC 06/08
 
  PO 06/13 2159  2137
 
Enoxaparin Sodium 40 MG DAILY 06/07 1000 AC 06/09
 
  SC   0842
 
Folic Acid 1 MG DAILY 06/07 1000 AC 06/09
 
  PO   0843
 
Gabapentin 300 MG BID 06/06 2200 AC 06/09
 
  PO   0842
 
Insulin Aspart 0 AT BEDTIME 06/09 2200 AC 
 
  SC   
 
Insulin Aspart 0 TIDAC 06/07 0800 AC 06/09
 
  SC   1145
 
Insulin Detemir 20 UNITS DAILY 06/10 1000 DC 
 
  SC   
 
Insulin Detemir 25 UNITS DAILY 06/10 1000 AC 
 
  SC   
 
Insulin Detemir 5 UNITS ONCE ONE 06/09 1145 DC 06/09
 
  SC 06/09 1146  1145
 
Insulin Detemir 5 UNITS ONCE ONE 06/09 1000 DC 
 
  SC 06/09 1001  
 
Insulin Detemir 15 UNITS DAILY 06/07 1506 DC 06/09
 
  SC   0843
 
Ondansetron HCl 4 MG Q6P PRN 06/06 2030 AC 
 
  IV   
 
Pantoprazole Sodium 40 MG DAILY 06/07 1000 AC 06/09
 
  IV   0842
 
Risperidone 4 MG BID 06/07 1432 AC 06/09
 
  PO   0842
 
Tramadol HCl 50 MG Q6P PRN 06/08 1615 AC 
 
  PO   
 
Trazodone HCl 200 MG AT BEDTIME 06/06 2200 AC 06/08
 
  PO   2137
 
 
 
 
Review of Systems
Review of Systems:
As per HPI.
 
Past History
 
Travel History
Traveled to [place] past 21 day No
 
Medical History
Blood Transfusion Hx: No
Neurological: altered mental status
EENT: NONE
Cardiovascular: NONE
Respiratory: Pleural effusion
Gastrointestinal: NONE
Hepatic: NONE
Renal: NONE
Musculoskeletal: NONE
Psychiatric: schizophrenia
Endocrine: diabetes
Blood Disorders: NONE
Cancer(s): NONE
Other Medical Hx:
Schizophrenia diabetes neuropathy pleural effusion
 
Surgical History
Surgical History: unobtainable, 1
 
Psychosocial History
Where Do You Live? Home
Who Do You Live With? self
Services at Home: Occupational Therapy
Primary Language: English
Smoking Status: Current Everyday Smoker ([id number] since 9 yo)
ETOH Use: occasional use
Illicit Drug Use: marijuana
Living Will? unknown
Power of /HCP? unknown
Name of POA/HCP: unknown
Other Social History:
None
 
Functional Ability
ADLs
Independent: dressing, eating, toileting, bathing. 
Ambulation: walker
IADLs
Independent: telephone. 
 
Employment History
Employment: Disability (for voices)
 
Exam & Diagnostic Data
Vital Signs and I&O
Vital Signs
 
 
Date Time Temp Pulse Resp B/P B/P Pulse O2 O2 Flow FiO2
 
     Mean Ox Delivery Rate 
 
06/09 1430 98.2 89 20 110/80  97 Room Air  
 
06/09 0629 98.3 98 18 144/78  92 Room Air  
 
06/08 2208 98.2 84 16 152/82  96 Room Air  
 
 
 Intake & Output
 
 
 06/09 1600 06/09 0800 06/09 0000
 
Intake Total 1800 500 240
 
Output Total 670  
 
Balance 1130 500 240
 
    
 
Intake, IV  300 
 
Intake, Oral 1800 200 240
 
Output, Urine 670  
 
Patient 145 lb  
 
Weight   
 
 
 
Physical Exam:
Alert and oriented x3
Fluent and comprehends.
S1 and S2 are normal. RRR.
EOMI, MARYANN, no nystagmus, no eyelid ptosis, tongue midline, face - mild R 
nasolabial fold decrease, V1-V3 sensation is normal, hearing normal, TPZ strong.
Strength is intact 5/5 without drift. Mild decrease in tapping on the left. FNF 
normal. VF cut RUQ.
Reflexes symmetric. Sensory intact. Gait not tested.
Last 48 Hours of Lab Results:
 Laboratory Tests
 
 
 06/09 06/09 06/08
 
 1625 0634 0645
 
Chemistry   
 
  Sodium (137 - 145 mmol/L)  137 141
 
  Potassium (3.5 - 5.1 mmol/L)  4.0 3.9
 
  Chloride (98 - 107 mmol/L)  102 106
 
  Carbon Dioxide (22 - 30 mmol/L)  28 26
 
  Anion Gap (5 - 16)  6 8
 
  BUN (9 - 20 mg/dL)  6  L 6  L
 
  Creatinine (0.7 - 1.2 mg/dL)  0.8 0.6  L
 
  Estimated GFR (>60 ml/min)  > 60 > 60
 
  BUN/Creatinine Ratio (7 - 25 %)  7.5 10.0
 
  Glucose (65 - 99 mg/dL)  254  H 53  L
 
Hematology   
 
  CBC w Diff   NO MAN DIFF REQ
 
  WBC (4.8 - 10.8 /CUMM)   3.3  L
 
  RBC (4.70 - 6.10 /CUMM)   4.42  L
 
  Hgb (14.0 - 18.0 G/DL)   12.6  L
 
  Hct (42 - 52 %)   38.7  L
 
  MCV (80.0 - 94.0 FL)   87.7
 
  MCH (27.0 - 31.0 PG)   28.5
 
  RDW (11.5 - 14.5 %)   13.6
 
  Plt Count (130 - 400 /CUMM)   174
 
  MPV (7.4 - 10.4 FL)   7.8
 
  Gran % (42.2 - 75.2 %)   47.1
 
  Lymphocytes % (20.5 - 51.1 %)   39.1
 
  Monocytes % (1.7 - 9.3 %)   8.3
 
  Eosinophils % (0 - 5 %)   4.9
 
  Basophils % (0.0 - 2.0 %)   0.6
 
  Absolute Granulocytes (1.4 - 6.5 /CUMM)   1.5
 
  Absolute Lymphocytes (1.2 - 3.4 /CUMM)   1.3
 
  Absolute Monocytes (0.10 - 0.60 /CUMM)   0.3
 
  Absolute Eosinophils (0.0 - 0.7 /CUMM)   0.2
 
  Absolute Basophils (0.0 - 0.2 /CUMM)   0
 
  PUBS MCHC (33.0 - 37.0 G/DL)   32.5  L
 
Serology   
 
  Lyme Disease Antibody Pending  
 
 
 
Imaging/Other Studies:
MRI brain - No acute infarcts. Old infarct within L Occipital tip. Mild cerebral
atrophy.
C3-4 disc herniation indenting cord.
 
Assessment/Plan
Assessment:
56 year old man with pancreatitis, possibly with a TIA while in the hospital now
resolved. MRI brain unrevealing outside of an old Occcipital stroke he likely 
suffered last year. He also has incidentally a chronic C3-4 large disc 
herniation that is INDENTING THE CORD. However it does not seem to be effecting 
him clinically. 
Recommendations:
1. Recommend baby aspirin 81mg and Atorvastatin 40mg. 
2. If needs BP control could use ACEI.
3. Recommend dedicated C-spine MRI as outpt. No interventions needed at the 
moment.
 
YC
 
 
Consult Acknowledgment
- Thank you for your consult request.

## 2017-06-09 NOTE — NUR
SHIFT NOTE: PATIENT CONTINUES TO HAVE FACIAL ASYMMATRY WITH RIGHT FACIAL
DROOP. THIS WAS ADDRESSED WITH THE MEDICAL TEAM SINCE EARLY THIS SHIFT (8AM).
DR. NEWMAN AND THE TEAM WORKED THE PATIENT UP WITH AN MRI.
NEUROSURGERY/NEUROSURGICAL TEAM SAW THE PATIENT. RECOMMENDATIONS IN PATIENT'S
CHART. PATIENT REMAINS CONFUSED TO TIME/DATE, HOWEVER, ABLE TO VERBALIZE
INFORMATION PROVIDED TO HIM. DENIED PAIN THROUGHOUT THE ENTIRE SHIFT. GOOD
APPETITE.

## 2017-06-09 NOTE — NUR
Physical Therapy: Consult received and chart reviewed. Pt w/mass effect on
cervical cord. Spoke w/Resident who put PT consult in. Discussed putting pt on
hold from skilled PT at this time. She will reconsult after neurology examines
patient. Pt also presents with stroke like symptoms today.  Will Hold PT
evaluation at this time. Nurse aware. Thank you.

## 2017-06-09 NOTE — PN- HOUSESTAFF
**See Addendum**
Subjective
Follow-up For:
Pancreatitis
Subjective:
Patient was seen and examined this morning, lying comfortably in bed, alert and 
disoriented.  No overnight events reported except for confusion.
Patient denied any abdominal pain, nausea or vomiting.  Tolerate diet well, 
bowel movement yesterday.
I got the chance to speak with the sister today, we were trying to reach out for
her yesterday without response.  Sister denied any history of confusion or 
disorientation, reported that this confusion is new to his baseline, reported 
that he lives by himself, no incidence of of him getting lost, looked out the 
house, forget stove on, patient was able to take care of himself at home without
any issues according to the sister.
 
Review of Systems
Constitutional:
Reports: see HPI. 
 
Objective
Last 24 Hrs of Vital Signs/I&O
 Vital Signs
 
 
Date Time Temp Pulse Resp B/P B/P Pulse O2 O2 Flow FiO2
 
     Mean Ox Delivery Rate 
 
 0629 98.3 98 18 144/78  92 Room Air  
 
 2208 98.2 84 16 152/82  96 Room Air  
 
 1459 98.3 85 20 122/80  98   
 
 
 Intake & Output
 
 
  1600  0800  0000
 
Intake Total  500 240
 
Output Total   
 
Balance  500 240
 
    
 
Intake, IV  300 
 
Intake, Oral  200 240
 
 
 
 
Physical Exam
General Appearance: Alert, Cooperative, No Acute Distress
Skin: No Rashes, No Breakdown, No Significant Lesion
Skin Temp/Moisture Exam: Warm/Dry
HEENT: Atraumatic, PERRLA, EOMI, Mucous Membr. moist/pink
Neck: Supple, No JVD
Cardiovascular: Regular Rate, Normal S1, Normal S2, No Murmurs
Lungs: Clear to Auscultation, Normal Air Movement
Abdomen: Normal Bowel Sounds, Soft, No Tenderness, No Hepatospenomegaly, No 
Masses
Neurological: Normal Speech, Strength at 5/5 X4 Ext, Normal Tone, Sensation 
Intact, Cranial Nerves 3-12 NL, Reflexes 2+, flattening of right nasolabial fold
Extremities: No Clubbing, No Cyanosis, No Edema, Normal Pulses, No Tenderness/
Swelling
 
Assessment/Plan
Assessment:
Mr. Sher is 56-year-old male with past medical history significant for 
major depressive disorder with psychotic feature, diabetes mellitus, neuropathy,
stroke 2015 presented to ED with chief complain of epigastric pain for 2 month. 
 
 
Assessment:
#Acute pancreatitis
#Abnormal CT abdomen and pelvis findings suggestive for urinary bladder neoplasm
Hyperdense lesion located along the left lateral aspect of the urinary bladder 
which could represent clots or neoplasm. 
#Diabetes mellitus
#Major depressive disorder with psychotic features
 
 
#Acute pancreatitis
-Patient presented with history of epigastric pain for 2 month, no SIRS criteria
, BUN/creatinine within normal level, corrected calcium is 10 mg/dL, lipase was 
864, CT findings suggestive for acute pancreatitis BISAP score 0 
-Patient was kept nothing by mouth, optimal pain management and IV fluid Ringer 
lactate running at 200 mL/h
-Received Ringer lactate IV fluid
-Clinically patient improved significantly, no abdominal pain, nausea or 
vomiting, lipase 303
-Diet was started yesterday, patient is tolerating clear liquid diet well, was 
advanced to diabetic diet 
-Continue optimal pain management, avoid opioid
-Lipid panel was obtained, triglycerides within normal level 52, total 
cholesterol 108, HDL 64, HDL 34
-Patient denied alcohol consumption, no history of new medication
-Continue Protonix 40 daily
 
#Abnormal CT abdomen and pelvis findings suggestive for urinary bladder neoplasm
 
-CT abdomen and pelvis revealed Hyperdense lesion located along the left lateral
aspect of the urinary bladder which could represent clots or neoplasm
-Urology consultation Dr. Mc was obtained, thanks for recommendation
-Questionable urinary bladder cancer
-History of smoking 2 and have back per 2 days for at least 12 years
-Chest x-ray didn't reveal any evidence of metastatic disease within the chest
-Urine cytology is pending
-Recommendation for outpatient cystoscopy
 
#Diabetes mellitus
-Patient is following with endocrinologist, medication was confirmed via 
endocrinologist and pharmacy
-We'll increase liver metastases to 20 daily 
-Start NovoLog sliding scale at bedtime
-Continue to monitor blood sugar, if it is consistently high consider increase 
NovoLog sliding scale to high dose
-Accu check and NSS TIDAC medium dose 
-CCD 2
 
#New onset confusion, right facial asymmetry and slight left sided weakness
-Right facial symmetry with a slight left side weakness were noticed today 
-Patient has history of stroke 
-MRI head urget  pending
 
#Major depressive disorder with psychotic features
-Patient psychiatric was contacted to confirm medication
-Continue clonazepam 1 mg Qpm
-Continue gabapentin 300 3 times a day
-Continue risperidone 4 mg twice a day
-Continue trazodone 200 mg Qpm 
-Continue bupropion 300 mg daily 
 
 
 
 
Code full
DVT prophylaxis with SC Lovenox
Diet CC2
Consultation urology
 
Problem List:
 1. Pancreatitis, acute
 
 2. Bladder tumor
 
Pain Ratin
Pain Location:
none 
Pain Goal: Pain 4 or less
Pain Plan:
moderate pain pathway 
Tomorrow's Labs & Rationales:
NONE

## 2017-06-10 VITALS — SYSTOLIC BLOOD PRESSURE: 140 MMHG | DIASTOLIC BLOOD PRESSURE: 90 MMHG

## 2017-06-10 VITALS — SYSTOLIC BLOOD PRESSURE: 122 MMHG | DIASTOLIC BLOOD PRESSURE: 77 MMHG

## 2017-06-10 VITALS — DIASTOLIC BLOOD PRESSURE: 80 MMHG | SYSTOLIC BLOOD PRESSURE: 130 MMHG

## 2017-06-10 NOTE — PN- HOUSESTAFF
BHAVANA BRADLEY,[facility] 06/10/17 0828:
Subjective
Follow-up For:
Pancreatitis
DM
Confusion
Subjective:
Patient was seen and examined this morning, sitting comfortably in bed, over no 
complaint.  Urology yesterday, aspirin and statin were started.
Continue to have right facial drop--proved from yesterday, no neurological 
deficit.  Insulin regimen was adjusted yesterday, blood sugar controlled.
 
Review of Systems
Constitutional:
Reports: see HPI. 
 
Objective
Last 24 Hrs of Vital Signs/I&O
 Vital Signs
 
 
Date Time Temp Pulse Resp B/P B/P Pulse O2 O2 Flow FiO2
 
     Mean Ox Delivery Rate 
 
 2240 97.7 80 20 150/90  98 Room Air  
 
 1430 98.2 89 20 110/80  97 Room Air  
 
 
 Intake & Output
 
 
 06/10 1600 06/10 0800 06/10 0000
 
Intake Total  240 
 
Output Total   
 
Balance  240 
 
    
 
Intake, Oral  240 
 
Number  1 
 
Bowel   
 
Movements   
 
 
 
 
Physical Exam
General Appearance: Alert, Oriented X3, Cooperative, No Acute Distress
Cardiovascular: Regular Rate, Normal S1, Normal S2, No Murmurs
Lungs: Clear to Auscultation, Normal Air Movement
Abdomen: Normal Bowel Sounds, Soft, No Tenderness, No Hepatospenomegaly, No 
Masses
Neurological: Normal Gait, Normal Speech, Strength at 5/5 X4 Ext, Normal Tone, 
Sensation Intact, Cranial Nerves 3-12 NL, Reflexes 2+
Extremities: No Clubbing, No Cyanosis, No Edema, Normal Pulses, No Tenderness/
Swelling
 
Assessment/Plan
Assessment:
Mr. Sher is 56-year-old male with past medical history significant for 
major depressive disorder with psychotic feature, diabetes mellitus, neuropathy,
stroke 2015 presented to ED with chief complain of epigastric pain for 2 month. 
 
 
Assessment:
#Acute pancreatitis
#Abnormal CT abdomen and pelvis findings suggestive for urinary bladder neoplasm
Hyperdense lesion located along the left lateral aspect of the urinary bladder 
which could represent clots or neoplasm. 
#Diabetes mellitus
#Major depressive disorder with psychotic features
 
 
#Acute pancreatitis
-Patient presented with history of epigastric pain for 2 month, no SIRS criteria
, BUN/creatinine within normal level, corrected calcium is 10 mg/dL, lipase was 
864, CT findings suggestive for acute pancreatitis BISAP score 0 
-Patient was kept nothing by mouth, optimal pain management and IV fluid Ringer 
lactate running at 200 mL/h
-Clinically patient improved significantly, no abdominal pain, nausea or 
vomiting, lipase 303
-Diet was started yesterday, patient is tolerating clear liquid diet well, was 
advanced to diabetic diet 
-Continue optimal pain management, avoid opioid
-Lipid panel was obtained, triglycerides within normal level 52, total 
cholesterol 108, HDL 64, HDL 34
-Patient denied alcohol consumption, no history of new medication
-Continue Protonix 40 daily
 
#Incidental CT finding suggestive for urinary bladder neoplasm 
-CT abdomen and pelvis revealed Hyperdense lesion located along the left lateral
aspect of the urinary bladder which could represent clots or neoplasm
-Urology consultation Dr. Mc was obtained, thanks for recommendation
-Questionable urinary bladder cancer
-History of smoking 2 and have back per 2 days for at least 12 years
-Chest x-ray didn't reveal any evidence of metastatic disease within the chest
-Urine cytology is pending
-Recommendation for outpatient cystoscopy
 
#Diabetes mellitus
-Patient is following with endocrinologist, medication was confirmed via 
endocrinologist and pharmacy
-Continue levemir 20 daily 
-Continue NovoLog sliding scale at bedtime
-Continue to monitor blood sugar, if it is consistently high consider increase 
NovoLog sliding scale to high dose
-Accu check and NSS TIDAC medium dose 
-CCD 2
 
#New onset confusion, right facial asymmetry and slight left sided weakness
-Right facial symmetry with a slight left side weakness were noticed today 
-Patient has history of stroke 
-MRI head 17
- This is a very motion degraded study. No definite acute infarcts are 
identified. The stroke sensitive diffusion series is limited by the degree of 
motion artifact.
- There is parietal lobe predominant cerebral volume loss.
- At C3-C4, spondylytic changes result in mass effect on the cervical cord and 
suspected moderate central canal stenosis.
 
-Neurosurgery consultation was obtained, no recommendation for any intervention 
at this time
-Neurology consultation was obtained, recommendation for daily aspirin and 
statin, ACEI for blood pressure if it needs
-C-spine MRI as outpatient
-We'll start CIWA score, reports for possible withdrawing
-We'll obtain UA and urine toxicology level
 
#Major depressive disorder with psychotic features
-Patient psychiatric was contacted to confirm medication
-Continue clonazepam 1 mg Qpm
-Continue gabapentin 300 3 times a day
-Continue risperidone 4 mg twice a day
-Continue trazodone 200 mg Qpm 
-Continue bupropion 300 mg daily 
 
 
 
 
Code full
DVT prophylaxis with SC Lovenox
Diet CC2
Consultation urology
 
Problem List:
 1. Bladder tumor
 
 2. Pancreatitis, acute
 
 3. Confusion
 
Pain Ratin
Pain Location:
none
Pain Goal: Pain 4 or less
Pain Plan:
Mild pain pathway 
Tomorrow's Labs & Rationales:
CBC, BMP 
 
 
UMBERTO BRADLEYANAISDHAVAL 06/10/17 1535:
Attending MD Review Statement
 
Attending Statement
Attending MD Statement: examined this patient, discuss w/resident/PA/NP, agreed 
w/resident/PA/NP, reviewed EMR data (avail)
Attending Assessment/Plan:
56M PMH  major depressive disorder with psychotic feature, diabetes mellitus, 
neuropathy, stroke  admitted initially for acute idiopathic pancreatitis, 
improved following IV hydration and pain medication, developed confusion and 
slurred speech 1 day ago concerning for CVA, with MRI showing no evidence of CVA
, but C3-C4 disc herniation, though patient is asymptomatic in that regard.
 
Today patient is awake and alert and very cooperative.  He says he feels 
lightheaded sometimes when standing up but otherwise feels well.  He feels as if
his speech is somewhat different, more slurred, than usual.  I spoke to his 
daughter and ex-wife who think the patient sounds confused as well.  I also 
spoke to the patient's  who had come to visit, who informed me that the 
patient has a remote history of substance or alcohol abuse, and that in the past
month may have relapsed (he does not know this for sure, but is just concerned 
about it), and wonders if that could be contributing to the patient's confusion.
 
Neurologically the patient is intact.  His only abnormal finding is mild left 
facial droop, though the patient reports that this is chronic.  Cranial nerves 
are otherwise grossly intact, and his strength and sensation is intact.  He 
requires a walker for ambulation
 
Plan
- Continue on general medicine
- Would start CIWA scoring to monitor for evidence of withdrawal
- Send UA and urine toxicology
- Continue ASA and statin
- Continue heart healthy diet
- Continue home medications
- Continue to work with physical therapy
- Follow neurosurgery and neurology recommendations
- Follow urology recommendations for bladder lesion
- DVT PPx

## 2017-06-10 NOTE — NUR
LATE ENTRY
PATIENT LBM 06/06/17. +BS, ABD SOFT NOT DISTENDED. PATIENT DENIED ABD
TENDERNESS. INTERN SHERI WAS MADE AWARE. MIRALAX PRN WAS ORDERED HOWEVER
PATIENT DID HAVE A BOWEL SHORTLY AFTER AND BEFORE MIRALAX WAS ADMINISTERED

## 2017-06-11 VITALS — SYSTOLIC BLOOD PRESSURE: 120 MMHG | DIASTOLIC BLOOD PRESSURE: 70 MMHG

## 2017-06-11 VITALS — DIASTOLIC BLOOD PRESSURE: 80 MMHG | SYSTOLIC BLOOD PRESSURE: 130 MMHG

## 2017-06-11 VITALS — DIASTOLIC BLOOD PRESSURE: 70 MMHG | SYSTOLIC BLOOD PRESSURE: 122 MMHG

## 2017-06-11 VITALS — SYSTOLIC BLOOD PRESSURE: 134 MMHG | DIASTOLIC BLOOD PRESSURE: 84 MMHG

## 2017-06-11 VITALS — SYSTOLIC BLOOD PRESSURE: 130 MMHG | DIASTOLIC BLOOD PRESSURE: 80 MMHG

## 2017-06-11 VITALS — DIASTOLIC BLOOD PRESSURE: 84 MMHG | SYSTOLIC BLOOD PRESSURE: 134 MMHG

## 2017-06-11 LAB
ABSOLUTE GRANULOCYTE CT: 3.3 /CUMM (ref 1.4–6.5)
BASOPHILS # BLD: 0 /CUMM (ref 0–0.2)
BASOPHILS NFR BLD: 0.4 % (ref 0–2)
EOSINOPHIL # BLD: 0.3 /CUMM (ref 0–0.7)
EOSINOPHIL NFR BLD: 6.2 % (ref 0–5)
ERYTHROCYTE [DISTWIDTH] IN BLOOD BY AUTOMATED COUNT: 13.5 % (ref 11.5–14.5)
GRANULOCYTES NFR BLD: 58.8 % (ref 42.2–75.2)
HCT VFR BLD CALC: 39.3 % (ref 42–52)
LYMPHOCYTES # BLD: 1.5 /CUMM (ref 1.2–3.4)
MCH RBC QN AUTO: 28.4 PG (ref 27–31)
MCHC RBC AUTO-ENTMCNC: 32.6 G/DL (ref 33–37)
MCV RBC AUTO: 87.2 FL (ref 80–94)
MONOCYTES # BLD: 0.4 /CUMM (ref 0.1–0.6)
PLATELET # BLD: 227 /CUMM (ref 130–400)
PMV BLD AUTO: 7.9 FL (ref 7.4–10.4)
RED BLOOD CELL CT: 4.51 /CUMM (ref 4.7–6.1)
WBC # BLD AUTO: 5.7 /CUMM (ref 4.8–10.8)

## 2017-06-11 NOTE — PN- HOUSESTAFF
BHAVANA BRADLEY,[facility] 17 0816:
Subjective
Follow-up For:
Pancreatitis
DM
Confusion
Subjective:
Patient was seen and examined this morning, vital signs are stable, sugar 
controlled, offered no complaint.  No overnight events reported by the nurse of 
the patient.
 
Review of Systems
Constitutional:
Reports: see HPI. 
 
Objective
Last 24 Hrs of Vital Signs/I&O
 Vital Signs
 
 
Date Time Temp Pulse Resp B/P B/P Pulse O2 O2 Flow FiO2
 
     Mean Ox Delivery Rate 
 
 0800 98.4 80 20 134/84     
 
/11 0631 98.4 80 20 134/84  96 Room Air  
 
 0600 97.8 85 18 130/80     
 
 0400 97.8 85 18 130/80     
 
 0200 97.8 85 18 130/80     
 
 0000 97.8 85 18 130/80     
 
06/10 2205 97.8 85 20 130/80  95   
 
/10 1407 98.0 66 20 122/77  98   
 
 
 Intake & Output
 
 
  1600  0800  0000
 
Intake Total   
 
Output Total  1325 
 
Balance  -1325 
 
    
 
Output, Urine  1325 
 
 
 
 
Physical Exam
General Appearance: Alert, Cooperative, No Acute Distress
Skin: No Rashes, No Breakdown, No Significant Lesion
Cardiovascular: Regular Rate, Normal S1, Normal S2, No Murmurs
Lungs: Clear to Auscultation, Normal Air Movement
Abdomen: Normal Bowel Sounds, Soft, No Tenderness, No Hepatospenomegaly, No 
Masses
Neurological: Normal Gait, Normal Speech, Strength at 5/5 X4 Ext, Normal Tone, 
Sensation Intact, Cranial Nerves 3-12 NL, Reflexes 2+
Extremities: No Clubbing, No Cyanosis, No Edema, Normal Pulses, No Tenderness/
Swelling
 
Assessment/Plan
Assessment:
Mr. Sher is 56-year-old male with past medical history significant for 
major depressive disorder with psychotic feature, diabetes mellitus, neuropathy,
stroke 2015 presented to ED with chief complain of epigastric pain for 2 month. 
 
 
Assessment:
#Acute pancreatitis
#Abnormal CT abdomen and pelvis findings suggestive for urinary bladder neoplasm
Hyperdense lesion located along the left lateral aspect of the urinary bladder 
which could represent clots or neoplasm. 
#Diabetes mellitus
#Major depressive disorder with psychotic features
 
 
#Acute pancreatitis
-Patient presented with history of epigastric pain for 2 month, no SIRS criteria
, BUN/creatinine within normal level, corrected calcium is 10 mg/dL, lipase was 
864, CT findings suggestive for acute pancreatitis BISAP score 0 
-Patient was kept nothing by mouth, optimal pain management and IV fluid Ringer 
lactate running at 200 mL/h
-Clinically patient improved significantly, no abdominal pain, nausea or 
vomiting, lipase 303
-Diet was started yesterday, patient is tolerating clear liquid diet well, was 
advanced to diabetic diet 
-Continue optimal pain management, avoid opioid
-Lipid panel was obtained, triglycerides within normal level 52, total 
cholesterol 108, HDL 64, HDL 34
-Patient denied alcohol consumption, no history of new medication
-Continue Protonix 40 daily
 
#Incidental CT finding suggestive for urinary bladder neoplasm 
-CT abdomen and pelvis revealed Hyperdense lesion located along the left lateral
aspect of the urinary bladder which could represent clots or neoplasm
-Urology consultation Dr. Mc was obtained, thanks for recommendation
-Questionable urinary bladder cancer
-History of smoking 2 and have back per 2 days for at least 12 years
-Chest x-ray didn't reveal any evidence of metastatic disease within the chest
-Urine cytology is pending
-Recommendation for outpatient cystoscopy
 
#Diabetes mellitus
-Patient is following with endocrinologist, medication was confirmed via 
endocrinologist and pharmacy
-Continue levemir 20 daily 
-Continue NovoLog sliding scale at bedtime
-Accu check and NSS TIDAC high-dose 
-CCD 2
 
#New onset confusion, right facial asymmetry and slight left sided weakness
-Right facial symmetry with a slight left side weakness were noticed today 
-Patient has history of stroke 
-MRI head 17
- This is a very motion degraded study. No definite acute infarcts are 
identified. The stroke sensitive diffusion series is limited by the degree of 
motion artifact.
- There is parietal lobe predominant cerebral volume loss.
- At C3-C4, spondylytic changes result in mass effect on the cervical cord and 
suspected moderate central canal stenosis.
 
-Neurosurgery consultation was obtained, no recommendation for any intervention 
at this time
-Neurology consultation was obtained, recommendation for daily aspirin and 
statin, ACEI for blood pressure if it needs
-C-spine MRI as outpatient
-CIWA score, reports for possible withdrawing, CIWA 0-3 scoring for orientation
-UA negative, urine toxin negative
 
#Major depressive disorder with psychotic features
-Patient psychiatric was contacted to confirm medication
-Continue clonazepam 1 mg Qpm
-Continue gabapentin 300 3 times a day
-Continue risperidone 4 mg twice a day
-Continue trazodone 200 mg Qpm 
-Continue bupropion 300 mg daily 
 
 
 
PT evaluation, recommendation for short-term rehabilitation
Code full
DVT prophylaxis with SC Lovenox
Diet CC2
Consultation urology
 
Problem List:
 1. Confusion
 
 2. Bladder tumor
 
 3. Pancreatitis, acute
 
Pain Ratin
Pain Location:
none 
Pain Goal: Pain 4 or less
Pain Plan:
Mild pain pathway 
Tomorrow's Labs & Rationales:
NONE 
 
 
UMBERTO BRADLEYANAISDHAVAL 17 1107:
Attending MD Review Statement
 
Attending Statement
Attending MD Statement: examined this patient, discuss w/resident/PA/NP, agreed 
w/resident/PA/NP, reviewed EMR data (avail)
Attending Assessment/Plan:
56M PMH  major depressive disorder with psychotic feature, diabetes mellitus, 
neuropathy, stroke  admitted initially for acute idiopathic pancreatitis, 
improved following IV hydration and pain medication, developed confusion and 
slurred speech 1 day ago concerning for CVA, with MRI showing no evidence of CVA
, but C3-C4 disc herniation, though patient is asymptomatic in that regard.
 
Neurologically the patient is intact.  His only abnormal finding is mild left 
facial droop, though the patient reports that this is chronic.  Cranial nerves 
are otherwise grossly intact, and his strength and sensation is intact.  He 
requires a walker for ambulation.  He is less confused today.
 
Plan
- Continue on general medicine
- Would start CIWA scoring to monitor for evidence of withdrawal
- UA and urine toxicology negative
- Continue ASA and statin
- Continue heart healthy diet
- Continue home medications
- Continue to work with physical therapy
- Follow neurosurgery and neurology recommendations
- Follow urology recommendations for bladder lesion
- DVT PPx
- Anticipated discharge tomorrow to [facility] home

## 2017-06-12 VITALS — DIASTOLIC BLOOD PRESSURE: 70 MMHG | SYSTOLIC BLOOD PRESSURE: 120 MMHG

## 2017-06-12 VITALS — DIASTOLIC BLOOD PRESSURE: 76 MMHG | SYSTOLIC BLOOD PRESSURE: 124 MMHG

## 2017-06-12 VITALS — SYSTOLIC BLOOD PRESSURE: 120 MMHG | DIASTOLIC BLOOD PRESSURE: 70 MMHG

## 2017-06-12 NOTE — PN- HOUSESTAFF
BHAVANA BRADLEY,[facility] 17 0709:
Subjective
Follow-up For:
Pancreatitis
DM
Confusion
Subjective:
Patient was seen and examined this morning, offered no complaints, vital signs 
are stable, sugar 173.
Patient will be discharged home with home services.
 
Review of Systems
Constitutional:
Reports: see HPI. 
 
Objective
Last 24 Hrs of Vital Signs/I&O
 Vital Signs
 
 
Date Time Temp Pulse Resp B/P B/P Pulse O2 O2 Flow FiO2
 
     Mean Ox Delivery Rate 
 
 1001       Room Air  
 
 0640 97.8 80 16 124/76  95 Room Air  
 
 0600 98.8 80 18 120/70     
 
 0400 98.8 80 18 120/70     
 
 0200 98.8 80 18 120/70     
 
 0000 98.8 80 18 120/70     
 
 2249 98.8 80 20 120/70  95 Room Air  
 
 
 Intake & Output
 
 
  1600  0800  0000
 
Intake Total   
 
Output Total   350
 
Balance   -350
 
    
 
Output, Urine   350
 
 
 
 
Physical Exam
General Appearance: Alert, Cooperative, No Acute Distress
Skin: No Rashes, No Breakdown, No Significant Lesion
HEENT: Atraumatic, PERRLA, EOMI, Mucous Membr. moist/pink
Neck: Supple, No JVD
Cardiovascular: Regular Rate, Normal S1, Normal S2, No Murmurs
Lungs: Clear to Auscultation, Normal Air Movement
Abdomen: Normal Bowel Sounds, Soft, No Tenderness, No Hepatospenomegaly, No 
Masses
Neurological: Normal Gait, Normal Speech, Strength at 5/5 X4 Ext, Normal Tone, 
Sensation Intact, Cranial Nerves 3-12 NL, Reflexes 2+
Extremities: No Clubbing, No Cyanosis, No Edema, Normal Pulses, No Tenderness/
Swelling
 
Assessment/Plan
Assessment:
Mr. Sher is 56-year-old male with past medical history significant for 
major depressive disorder with psychotic feature, diabetes mellitus, neuropathy,
stroke 2015 presented to ED with chief complain of epigastric pain for 2 month. 
 
 
Assessment:
#Acute pancreatitis
#Abnormal CT abdomen and pelvis findings suggestive for urinary bladder neoplasm
Hyperdense lesion located along the left lateral aspect of the urinary bladder 
which could represent clots or neoplasm. 
#Diabetes mellitus
#Major depressive disorder with psychotic features
 
 
#Acute pancreatitis
-Patient presented with history of epigastric pain for 2 month, no SIRS criteria
, BUN/creatinine within normal level, corrected calcium is 10 mg/dL, lipase was 
864, CT findings suggestive for acute pancreatitis BISAP score 0 
-Patient was kept nothing by mouth, optimal pain management and IV fluid Ringer 
lactate running at 200 mL/h
-Clinically patient improved significantly, no abdominal pain, nausea or 
vomiting, lipase 303
-Diet was started yesterday, patient is tolerating clear liquid diet well, was 
advanced to diabetic diet 
-Continue optimal pain management, avoid opioid
-Lipid panel was obtained, triglycerides within normal level 52, total 
cholesterol 108, HDL 64, HDL 34
-Patient denied alcohol consumption, no history of new medication
-Continue Protonix 40 daily
 
#Incidental CT finding suggestive for urinary bladder neoplasm 
-CT abdomen and pelvis revealed Hyperdense lesion located along the left lateral
aspect of the urinary bladder which could represent clots or neoplasm
-Urology consultation Dr. Mc was obtained, thanks for recommendation
-Questionable urinary bladder cancer
-History of smoking 2 and have back per 2 days for at least 12 years
-Chest x-ray didn't reveal any evidence of metastatic disease within the chest
-Urine cytology is pending
-Recommendation for outpatient cystoscopy
 
#Diabetes mellitus
-Patient is following with endocrinologist, medication was confirmed via 
endocrinologist and pharmacy
-Continue levemir 20 daily 
-Continue NovoLog sliding scale at bedtime
-Accu check and NSS TIDAC high-dose 
-CCD 2
 
#New onset confusion, right facial asymmetry and slight left sided weakness
-Right facial symmetry with a slight left side weakness were noticed today 
-Patient has history of stroke 
-MRI head 17
- This is a very motion degraded study. No definite acute infarcts are 
identified. The stroke sensitive diffusion series is limited by the degree of 
motion artifact.
- There is parietal lobe predominant cerebral volume loss.
- At C3-C4, spondylytic changes result in mass effect on the cervical cord and 
suspected moderate central canal stenosis.
 
-Neurosurgery consultation was obtained, no recommendation for any intervention 
at this time
-Neurology consultation was obtained, recommendation for daily aspirin and 
statin, ACEI for blood pressure if it needs
-C-spine MRI as outpatient
-CIWA score, reports for possible withdrawing, CIWA 0-3 scoring for orientation
-UA negative, urine toxin negative
 
#Major depressive disorder with psychotic features
-Patient psychiatric was contacted to confirm medication
-Continue clonazepam 1 mg Qpm
-Continue gabapentin 300 3 times a day
-Continue risperidone 4 mg twice a day
-Continue trazodone 200 mg Qpm 
-Continue bupropion 300 mg daily 
 
 
 
PT evaluation, recommendation for short-term rehabilitation
Code full
DVT prophylaxis with SC Lovenox
Diet CC2
Consultation urology
 
Problem List:
 1. Pancreatitis, acute
 
 2. Bladder tumor
 
 3. Confusion
 
Pain Ratin
Pain Location:
None
Pain Goal: Pain 4 or less
Pain Plan:
Mild pain pathway
Tomorrow's Labs & Rationales:
None
 
 
VERONICA NEWMAN MD 17 1309:
Attending MD Review Statement
 
Attending Statement
Attending MD Statement: examined this patient, discuss w/resident/PA/NP, agreed 
w/resident/PA/NP, reviewed EMR data (avail), discussed with nursing, amended to 
note
Attending Assessment/Plan:
Patient seen and examined, feeling overall much better.  Speech has improved and
patient walked with physical therapy and performed pretty well.  Patient 
absolutely denies any complaints.  And he refuses to go to rehabilitation.  
Patient was seen by both neurosurgery and neurology.  No neurosurgical 
interventions per neurosurgery but neurology recommended to add aspirin as well 
as statin.  Patient is medically stable for discharge today.  He will get home 
services as well as a walker.  Patient should follow-up with neurosurgery, 
neurology, his primary care doctor as well as endocrinology as an outpatient.  
We will continue him on his home insulin regimen as confirmed with his 
endocrinologist in [place].

## 2017-06-12 NOTE — DISCHARGE SUMMARY
Visit Information
 
Visit Dates
Admission Date:
06/06/17
 
Discharge Date:
06/12/17
 
 
Hospital Course
 
Course
Attending Physician:
SAYDA SHIPMAN MD
 
Allergies:
Coded Allergies:
No Known Drug Allergies (Intermediate, NONE 06/06/17)
 
 
Discharge Instructions
 
Medications at Discharge
Discharge Medications:
Continue taking these medications:
Insulin Lispro (Humalog Kwikpen U-100) 100 UNIT/ML INSULN.PEN
    5 Units Inject into fatty tissue 3 TIMES DAILY BEFORE MEALS
    Qty = 15
    Comments:
       NOT GIVEN AT HOSPITAL
 
Canagliflozin (Invokana) 300 MG TABLET
    1 Tablet ORAL DAILY
    Qty = 30
    Comments:
       Last Taken: NOT GIVEN AT HOSPITAL
             Time:
 
Clonazepam (Clonazepam) 1 MG TABLET
    1 Tablet ORAL Every night
    Qty = 30
    Comments:
       Last Taken: 6/11/17
             Time: 9 PM
 
Folic Acid (Folic Acid) 1 MG TABLET
    1 Tablet ORAL DAILY
    Qty = 30
    Comments:
       Last Taken: 6/12/17
             Time:  9 AM
 
Risperidone (Risperidone) 4 MG TABLET
    1 Tablet ORAL TWICE DAILY
    Qty = 60
    Comments:
       Last Taken: 6/12/17
             Time: 9 AM
 
Trazodone HCl (Trazodone HCl) 100 MG TABLET
    2 Tablet ORAL TAKE AT BEDTIME
    Qty = 60
    Comments:
       Last Taken: 6/11/17
             Time: 9 PM
 
Bupropion HCl (Bupropion XL) 300 MG TAB.ER.24H
    1 Tablet ORAL DAILY
    Qty = 30
    Comments:
       Last Taken: 6/12/17
             Time: 9 AM
 
Gabapentin (Gabapentin) 300 MG CAPSULE
    1 Capsule ORAL TWICE DAILY
    Qty = 60
 
Insulin Glargine,Hum.rec.anlog (Toujeo Solostar) (Unknown Strength) INSULN.PEN
    30 Units Inject into fatty tissue EVERY MONDAY
    Qty = 9
    Comments:
       Last Taken: NOT GIVEN AT HOSPITAL
             Time:
 
Lisinopril (Lisinopril) 2.5 MG TABLET
    1 Tablet ORAL DAILY
    Qty = 90
    Comments:
       Last Taken: NOT GIVEN AT HOSPITAL
             Time:
 
Atorvastatin Calcium (Atorvastatin Calcium) 40 MG TABLET
    1 Tablet ORAL DAILY
    Qty = 90
    Comments:
       Last Taken: 6/11
             Time: 5 PM
 
Sitagliptin Phos/Metformin HCl (Janumet 50-1,000 MG Tablet) 50 MG-1,000 MG 
TABLET
    1 Tablet ORAL TWICE DAILY
    Qty = 180
    Comments:
       NOT GIVEN AT HOSPITAL
 
Linaclotide (Linzess) 145 MCG CAPSULE
    1 Capsule ORAL DAILY
    Qty = 30
    Comments:
       Last Taken: NOT GIVEN AT HOSPITAL
             Time:
 
Start taking the following new medications:
Aspirin (Aspirin*) 81 MG TAB.CHEW
    81 Milligram ORAL DAILY
    Qty = 60
    No Refills
    Comments:
       Last Taken: 6/12/17
             Time: 9 AM

## 2017-07-27 ENCOUNTER — HOSPITAL ENCOUNTER (OUTPATIENT)
Dept: HOSPITAL 68 - STS | Age: 57
End: 2017-07-27
Attending: UROLOGY
Payer: COMMERCIAL

## 2017-07-27 VITALS — HEIGHT: 65 IN | BODY MASS INDEX: 26.99 KG/M2 | WEIGHT: 162 LBS

## 2017-07-27 DIAGNOSIS — F17.200: ICD-10-CM

## 2017-07-27 DIAGNOSIS — E11.9: ICD-10-CM

## 2017-07-27 DIAGNOSIS — C67.2: Primary | ICD-10-CM

## 2017-07-27 DIAGNOSIS — R31.0: ICD-10-CM

## 2017-07-27 PROCEDURE — C9399 UNCLASSIFIED DRUGS OR BIOLOG: HCPCS

## 2017-07-28 ENCOUNTER — HOSPITAL ENCOUNTER (EMERGENCY)
Dept: HOSPITAL 68 - ERH | Age: 57
End: 2017-07-28
Payer: COMMERCIAL

## 2017-07-28 VITALS — SYSTOLIC BLOOD PRESSURE: 122 MMHG | DIASTOLIC BLOOD PRESSURE: 78 MMHG

## 2017-07-28 VITALS — BODY MASS INDEX: 24.55 KG/M2 | WEIGHT: 162 LBS | HEIGHT: 68 IN

## 2017-07-28 DIAGNOSIS — R10.9: Primary | ICD-10-CM

## 2017-07-28 LAB
ABSOLUTE GRANULOCYTE CT: 5 /CUMM (ref 1.4–6.5)
BASOPHILS # BLD: 0 /CUMM (ref 0–0.2)
BASOPHILS NFR BLD: 0.3 % (ref 0–2)
EOSINOPHIL # BLD: 0.1 /CUMM (ref 0–0.7)
EOSINOPHIL NFR BLD: 1.1 % (ref 0–5)
ERYTHROCYTE [DISTWIDTH] IN BLOOD BY AUTOMATED COUNT: 14.3 % (ref 11.5–14.5)
GRANULOCYTES NFR BLD: 74.1 % (ref 42.2–75.2)
HCT VFR BLD CALC: 41.8 % (ref 42–52)
LYMPHOCYTES # BLD: 1.2 /CUMM (ref 1.2–3.4)
MCH RBC QN AUTO: 28.8 PG (ref 27–31)
MCHC RBC AUTO-ENTMCNC: 32.8 G/DL (ref 33–37)
MCV RBC AUTO: 87.9 FL (ref 80–94)
MONOCYTES # BLD: 0.4 /CUMM (ref 0.1–0.6)
PLATELET # BLD: 230 /CUMM (ref 130–400)
PMV BLD AUTO: 8.1 FL (ref 7.4–10.4)
RED BLOOD CELL CT: 4.76 /CUMM (ref 4.7–6.1)
WBC # BLD AUTO: 6.8 /CUMM (ref 4.8–10.8)

## 2017-07-28 NOTE — CT SCAN REPORT
EXAMINATION:
CT ABDOMEN AND PELVIS WITH CONTRAST
 
CLINICAL INFORMATION:
Diffuse abdominal pain. Prostate surgery yesterday.
 
COMPARISON:
CT abdomen and pelvis of 06/06/2017.
 
TECHNIQUE:
Multidetector volumetric CT imaging of the abdomen and pelvis is acquired
following intravenous contrast administration. 98 mL of Optiray 320 was
administered intravenously. Postprocessing is performed at a dedicated
workstation. Multiplanar 2-D reformatted images are submitted.
 
DLP:
470.72 mGy-cm
 
FINDINGS:
The lung bases are clear. No pleural or pericardial effusions. The cardiac
size is normal.
 
The liver is normal in size, shape and attenuation. No focal liver lesion or
biliary ductal dilatation. The gallbladder is mildly contracted; however,
there is no evidence of radiopaque stones, wall thickening or obvious
pericholecystic inflammatory changes.
 
The pancreas, spleen and adrenal glands are unremarkable.
 
The kidneys are normal in size, shape and attenuation. There is symmetrical
enhancement of the kidneys. No evidence of radiopaque renal calculi. No
hydroureteronephrosis.
 
The urinary bladder is underdistended. There is a hyperdense masslike density
within the lumen of the urinary bladder on the left posterior lateral aspect
which is difficult to measure exactly. It approximately measures 3.7 x 2.7 x
2.2 cm (series 2 images 67 through 72/93) small amount of air is noted in the
urinary bladder anteriorly. There is a moderate amount of stranding and fluid
in the prevesicular space, in the left pelvis along the pelvic wall adjacent
to the urinary bladder extending into the presacral region. Multiple free air
foci are noted within this soft tissue stranding and fluid along the left
pelvic wall (series 2 images 71 through 63/93). Correlate with the nature of
the surgery. The prostate is normal in size. Seminal vesicles are
unremarkable.
 
The colon is normal in caliber. No evidence of colonic wall thickening or
pericolonic fat stranding. Mild diverticulosis of the sigmoid colon and
descending colon is noted without acute diverticulitis. An appendix is
normal. The small bowel is not dilated. The stomach is partially distended
and unremarkable.
 
There is no evidence of pathologically enlarged lymph nodes. The aortoiliac
vessels are normal in caliber and well opacified. No significant abdominal
wall hernia is noted.
 
No acute or suspicious osseous abnormality. Mild degenerative changes are
noted at L5-S1.
 
IMPRESSION:
1. No evidence of acute appendicitis, diverticulitis or bowel obstruction.
 
2. Abnormal appearance of the urinary bladder with hyperdense lobulated focus
in the urinary bladder on the left which may represent a hematoma; however,
without precontrast examination for comparison and without prior examination
for comparison, enhancing soft tissue mass cannot be excluded. There is
moderate amount of prevesicular stranding and fluid extending along the left
pelvic wall and presacral region. Multiple free air foci are noted along the
left pelvic wall phlegmonous changes. Recommend correlation with the nature
of surgery. Please note that the prostate and seminal vesicles have
unremarkable appearance on the CT scan.
 
The findings were discussed with the referring provider Mr. Alex Yi on
07/28/2017 following initial review of this study.
 
Reportedly patient has had transurethral bladder tumor resection yesterday.
Therefore the hyperdensity seen within the lumen of the bladder most likely
represent hematoma. The findings around the bladder, along the left pelvic
wall and presacral region may or may not represent expected postsurgical
change.

## 2017-07-28 NOTE — OPERATIVE REPORT
Operative/Inv Procedure Report
Surgery Date: 07/27/17
Name of Procedure:
Cystoscopy, TURBT of 2.5 cm papillary tumor on the left lateral wall of the 
bladder, mitomycin instillation in the recovery room.
Pre-Operative Diagnosis:
Gross hematuria with bladder mass
Post-Operative Diagnosis:
Bladder cancer, large
Estimated Blood Loss: 50ml to 100ml
Surgeon/Assistant:
ISELA JAMES MD
 
Anesthesia: general endotracheal tube
Drains:
20 Bangladeshi Rowley catheter
Specimens:
Bladder tumor
Complications:
None
 
Operative/Procedure Note
Note:
The patient was taken to the operating room, and placed on the OR table in 
supine position.  Timeout was performed, with the patient awake, to confirm 
correct patient, procedure, anesthesia, antibiotics, and other pertinent gregory-
operative information.  After adequate anesthesia and antibiotics, the patient 
was then placed in lithotomy stirrups, draped and prepped in usual surgical 
fashion.  A 26 Bangladeshi resectoscope sheath with a 30 angle lens, and 24 Bangladeshi 
loop, was inserted into the urethra, and advanced into the bladder without 
difficulty.  Upon entering the bladder, the bladder was noted to be mildly 
trabeculated.  Both ureteral orifices were in their orthotopic position, with 
clear reflux bilaterally.  A 2.5 Cm papillary lesion, with small multifocal 
papillary lesions, were visualized on the left laterall wall, and base, of the 
bladder, consistent with tumor.   No other tumors were seen on thorough and 
systematic surveillance.  Under direct visualization, this 2.5 cm papillary 
lesion was resected from superficial to deeper layers, including partial 
detrusor muscle base resection.  The entire tumor was removed, along with a 0.5 
cm margin of normal mucosa.  The tiny papillary lesions suspicious for bladder 
cancer were all fulgurated and noted to have good hemostasis.  The tumor 
fragments were evacuated, and sent to pathology.  Cauterization of the base of 
the tumor resection was performed, in order to achieve good hemostasis.  The 
bladder was copiously irrigated once again with 3 L of sorbitol.  The 
resectoscope was removed leaving the bladder full.  The bladder was drained by 
placing a 16 Bangladeshi Rowley catheter, without difficulty, and with clear fluid.  
10 mL of sterile water was placed in the balloon, and the Rowley catheter was 
plugged after complete decompression of the bladder.  The patient tolerated 
procedure well was then taken to recovery room in satisfactory condition.
 
In the recovery room, Mitomycin 40mg in 40cc sterile saline, was instilled into 
the bladder using the indwelling 16fr catheter.   The Rowley was clamped, for one
hour, with the pt turned from side-to-side every 15minutes, during mitomycin 
instillation.  The patient tolerated this part of the procedures well, the 
catheter was removed for the pt. to void.
 
The patient tolerated both procedures well and discharged withpain meds, and 
antibiotics.  The patient is scheduled for follow-up in the office in 2 weeks 
time.
Findings:
2.5 cm, multifocal, papillary bladder tumor on left lateral wall. 
Discharge Disposition: PACU
Additional Comments:
Mitomycin 40 mg in 40 mL of sterile water was instilled into the patient's 
bladder, in the recovery room ,via the indwelling Rowley catheter, and clamped 
for 1-1/2 hours.
CC:
ANTONIO BRADLEY,FRANKLIN; REGINALDO HANSON; ISELA JAMES MD

## 2017-07-28 NOTE — ED GI/GU/ABDOMINAL COMPLAINT
History of Present Illness
 
General
Chief Complaint: General Adult
Stated Complaint: BIBA MULTIPLE COMPLAINTS
Source: patient, old records
Exam Limitations: no limitations
 
Vital Signs & Intake/Output
Vital Signs & Intake/Output
 Vital Signs
 
 
Date Time Temp Pulse Resp B/P B/P Pulse O2 O2 Flow FiO2
 
     Mean Ox Delivery Rate 
 
07/28 2109 98.0 80 16 122/78  98 Room Air  
 
07/28 1548 97.0 79 18 129/72  95 Room Air  
 
07/28 1456 97.0 80  125/83     
 
07/28 1432      97   
 
07/28 1424 96.5 92 20 127/73  99 Room Air Room Air 
 
 
Allergies
Coded Allergies:
No Known Drug Allergies (Intermediate, NONE 06/06/17)
 
Reconcile Medications
Aspirin (Aspirin*) 81 MG TAB.CHEW   81 MG PO DAILY HEART HEALTH
Atorvastatin Calcium 40 MG TABLET   1 TAB PO DAILY CHOLESTEROL  (Reported)
Bupropion HCl (Bupropion XL) 300 MG TAB.ER.24H   1 TAB PO DAILY MENTAL HEALTH  (
Reported)
Canagliflozin (Invokana) 300 MG TABLET   1 TAB PO DAILY DM  (Reported)
Clonazepam 1 MG TABLET   1 TAB PO QPM ANXIETY/SLEEP  (Reported)
Folic Acid 1 MG TABLET   1 TAB PO DAILY SUPPLEMENT  (Reported)
Gabapentin 300 MG CAPSULE   1 CAP PO BID MENTAL HEALTH  (Reported)
Insulin Glargine,Hum.rec.anlog (Toujeo Solostar) (Unknown Strength) INSULN.PEN  
30 UNITS SC QMON DM  (Reported)
Insulin Lispro (Humalog Kwikpen U-100) 100 UNIT/ML INSULN.PEN   5 UNITS SC TIDAC
DM  (Reported)
Linaclotide (Linzess) 145 MCG CAPSULE   1 CAP PO DAILY GI  (Reported)
Lisinopril 2.5 MG TABLET   1 TAB PO DAILY BP  (Reported)
Oxycodone HCl/Acetaminophen (Percocet  MG Tablet) 10 MG-325 MG TABLET   1 
TAB PO 4 TIMES/DAY PRN pain
Risperidone 4 MG TABLET   1 TAB PO BID MENTAL HEALTH  (Reported)
Sitagliptin Phos/Metformin HCl (Janumet 50-1,000 MG Tablet) 50 MG-1,000 MG 
TABLET   1 TAB PO BID DM  (Reported)
Trazodone HCl 100 MG TABLET   2 TAB PO QHS MENTAL HEALTH  (Reported)
 
Triage Note:
PT TO ED S/P PROSTATE SURGERY YESTERDAY, TODAY
URINE IS BLOODY, AND PAIN MEDICINE IS NOT WORKING.
PROCEDURE DONE BY DR MC.
Triage Nurses Notes Reviewed? yes
HPI:
56-year-old male past medical history of BPH and diabetes presents complaining 
of acute onset of diffuse abdominal pain nausea and vomiting.  Patient states he
had trans urethral bladder tunor resection surgery yesterday with dr mc and 
was discharged home.  He states that he woke up this morning with pain located 
diffusely in his abdomen.  Pain is worse with any type of movement or touching 
the area.  It is not worse any 1 particular area of his abdomen.  It is 
associated with nausea and vomiting he is not pale have any bowel movements in 2
days.  No diarrhea.  He is not tolerating any fluids.  He was given Percocet to 
use for pain control but this is not helping.  This pain is different from pain 
is experienced in the past.  He also reports associated hematuria and painful 
urination.  He denies frequency or urgency, fever, chest pain shortness of 
breath, lower extremity edema or any other associated symptoms.
(BLACK PA-C,VELVET)
 
Past History
 
Travel History
Traveled to [place] past 21 day No
 
Medical History
Any Pertinent Medical History? none
Neurological: altered mental status
EENT: NONE
Cardiovascular: NONE
Respiratory: Pleural effusion
Gastrointestinal: NONE
Hepatic: NONE
Renal: benign prost hyperplasia
Musculoskeletal: NONE
Psychiatric: schizophrenia
Endocrine: diabetes
Blood Disorders: NONE
Cancer(s): NONE
GYN/Reproductive: NONE
Other Medical Hx:
Schizophrenia diabetes neuropathy pleural effusion
History of MRSA: No
History of VRE: No
History of CDIFF: No
 
Surgical History
Surgical History: non-contributory
 
Psychosocial History
Services at Home Occupational Therapy
What is your primary language English
Tobacco Use: Current Daily Use
Daily Tobacco Use Amount/Type: => 5 Cigarettes daily
ETOH Use: denies use
Illicit Drug Use: denies illicit drug use
 
Family History
Hx Contributory? No
(BLACK PA-C,VELVET)
 
Review of Systems
 
Review of Systems
Constitutional:
Reports: no symptoms. 
EENTM:
Reports: no symptoms. 
Respiratory:
Reports: no symptoms. 
Cardiovascular:
Reports: no symptoms. 
GI:
Reports: see HPI, abdominal pain, nausea, vomiting. 
Genitourinary:
Reports: hematuria. 
Musculoskeletal:
Reports: no symptoms. 
Skin:
Reports: no symptoms. 
Neurological/Psychological:
Reports: no symptoms. 
Hematologic/Endocrine:
Reports: no symptoms. 
Immunologic/Allergic:
Reports: no symptoms. 
All Other Systems: Reviewed and Negative
(BLACK PA-C,VELVET)
 
Physical Exam
 
Physical Exam
General Appearance: well developed/nourished, alert, awake, anxious, moderate 
distress
Gastrointestinal: normal bowel sounds, soft, no organomegaly, tenderness (
diffuse lower abdomen)
Comments:
 
Head: Atraumatic, normocephalic 
Eyes: EOMI bilaterally, PERRLA, conjunctiva are not injected, no discharge, no 
nystagmus, fundus grossly normal bilaterally
Nose: Atraumatic, no rhinorrhea, mucosa is not erythematous, no epistaxis. 
Sinuses are non-tender  
Ears: TM pearly gray color bilaterally, external canal is clear, no discharge, 
hearing is normal 
Mouth: Appropriate dentition, no gingival bleeding, moist mucus membranes, no 
oral lesions, tonsils not erythematous or enlarged and free of exudate. Uvula 
rises midline.
Neck: Supple, full active ROM, no lymphadenopathy, no midline tenderness to 
palpation, no thyromegaly, no tracheal deviation.  
Back: Non-tender, full active ROM, no scoliosis, no CVA tenderness     
Cardiovascular: regular rate and rhythm, no murmurs, rubs, or gallops. No JVD
Respiratory: Chest is nontender. Regular respiratory rate and effort.  No 
accessory muscle use. Lungs clear to auscultation bilaterally. 
Extremities: No edema. No gross deformities. No joint swelling. No calf swelling
or tenderness. Full active and passive ROM. Strength 5/5 in upper and lower 
extremities. Peripheral pulses 2+ bilaterally, Patellar DTR 2+  
Neuro: No confusion. Motor and sensory function is intact. Appropriate gait. 
Cerebellar function intact. 
Skin: Warm and dry. Appropriate turgor. No lesions or bruising. No appreciable 
rash on exposed skin.   
 
Core Measures
ACS in differential dx? No
Severe Sepsis Present: No
Septic Shock Present: No
(BLACK PA-C,VELVET)
 
Progress
Differential Diagnosis: appendicitis, biliary colic, bowel obstruction, 
cholecystitis, diverticulitis, ischemic bowel, inflamm bowel dis, pancreatitis, 
prostatitis, peptic ulcer, PUD/GERD, pyelonephritis, SBO, STD, testicular 
torsion, urinary retention, urethritis, UTI/pyelo
Plan of Care:
 Orders
 
 
Procedure Date/time Status
 
LACTIC ACID 07/28 1743 Active
 
URINALYSIS 07/28 1443 Complete
 
TROPONIN LEVEL 07/28 1443 Complete
 
LACTIC ACID 07/28 1443 Complete
 
COMPREHENSIVE METABOLIC PANEL 07/28 1443 Complete
 
CBC WITHOUT DIFFERENTIAL 07/28 1443 Complete
 
EKG 07/28 1443 Active
 
 
 Laboratory Tests
 
 
 
07/28/17 1722:
Urine Color PINK  H, Urine Clarity HAZY  H, Urine pH 7.0, Ur Specific Gravity 
1.010, Urine Protein 30  H, Urine Ketones TRACE  H, Urine Nitrite NEG, Urine 
Bilirubin NEG, Urine Urobilinogen 1.0, Ur Leukocyte Esterase NEG, Ur Microscopic
SEDIMENT EXAMINED, Urine RBC >75  H, Urine WBC 1-3  H, Ur Epithelial Cells FEW, 
Urine Hemoglobin LARGE  H, Urine Glucose 100  H
 
07/28/17 1453:
Anion Gap 12, Estimated GFR > 60, BUN/Creatinine Ratio 11.7, Glucose 237  H, 
Lactic Acid 2.1, Calcium 9.6, Total Bilirubin 1.2, AST 18, ALT 23, Alkaline 
Phosphatase 75, Troponin I < 0.01, Total Protein 7.1, Albumin 4.4, Globulin 2.7,
Albumin/Globulin Ratio 1.6, CBC w Diff NO MAN DIFF REQ, RBC 4.76, MCV 87.9, MCH 
28.8, RDW 14.3, MPV 8.1, Gran % 74.1, Lymphocytes % 18.4  L, Monocytes % 6.1, 
Eosinophils % 1.1, Basophils % 0.3, Absolute Granulocytes 5.0, Absolute 
Lymphocytes 1.2, Absolute Monocytes 0.4, Absolute Eosinophils 0.1, Absolute 
Basophils 0, PUBS MCHC 32.8  L
Patient seen and evaluated.  He is vomiting multiple times on evaluation he is 
holding his abdomen in pain.  He is hemodynamic was stable and afebrile.  He'll 
be given a liter of normal saline and 4 of Zofran and 1 of IV Dilaudid.  We'll 
check basic blood work urinalysis and a CT scan of the abdomen and pelvis.  We'
ll follow up on results.
 
CT scan shows what may possibly be a large hematoma and signs of inflammation 
around the bladder.  Other than mildly elevated lactate blood work has been 
within normal limits.  Spoke witH on-call urologist Dr Dorado he recommends 
placing an 18 French Rowley catheter to see if we can relieve the patient's 
symptoms.
 
Patient is feeling better after Rowley catheter was placed. Abdomen is soft. 
there is only mild pain with palpation of suprapubic area.
 
8 PM: Patient continues to feel much better since receiving Rowley catheter and 
pain medication.  He has not vomited for several hours.  He has not required any
additional pain medication for several hours.  He is tolerating fluids by mouth.
 Blood work does not show an elevated white blood cell count or reduced kidney 
function.  Patient will be discharged home with Percocet TO use for pain.  And 
instructions to keep Rowley in place.  Resting plenty of fluids.  Advised patient
he has to follow up on Monday with Dr. Mc.  Discussed return precautions in 
detail.  Return to the emergency department with worsening pain, fever unable to
urinate or any other concerns. case discussed with dr jorge he agrees with 
the plan.
I discussed with the patient at length all of their results.  I had an extensive
conversation regarding need for close follow up with their primary care 
physician this week as well as return precautions.  I answered all of their 
questions, they feel comfortable with the plan and follow-up care. 
 
 
I discussed with the patient/family the medications that they will receive. I 
gave them signs and symptoms that could indicate an adverse reaction. I have 
advised them to limit their activities until they can see how they respond to 
the medication.
(BLACK PA-C,VELVET)
Initial ED EKG: sinus rhythm, ventricular premature complexes, left axis 
deviation, borderline t wave abn
(BLACK PA-C,VELVET)
 
Departure
 
Departure
Disposition: HOME OR SELF CARE
Condition: Stable
Clinical Impression
Primary Impression: Abdominal pain
Qualifiers:  Abdominal location: lower abdomen, unspecified Qualified Code: 
R10.30 - Lower abdominal pain, unspecified
Referrals:
UNKNOWN (PCP/Family)
 
Additional Instructions:
Rest and drink plenty of fluids.  Use Percocet every 4-6 hours as needed for 
pain.  This may cause drowsiness.  Keep the Rowley in place.  You need to see Dr. Mc on Monday.  If you develop fever, worsening pain, or any other concerns 
return to the emergency Department immediately.
 
Please go over all results of today's visit with your primary care doctor.  
Contact your primary care doctor to let them know you were here in the emergency
room.  There may be nonspecific findings which may not be related to your visit 
today here in the emergency room but may require further evaluation and chronic 
monitoring by your primary care doctor.  If you had a laceration today the 
chance of foreign body always remains. You should follow-up with your primary 
care doctor for recheck in 3-5 days for a wound check.  If you had an x-ray done
there is a chance that a fracture could have been missed on initial read and you
should follow-up with your primary care doctor for repeat x-rays if symptoms 
persist.  If your blood pressure was elevated here in the emergency room please 
have rechecked by her primary care doctor within the next 48 hours by your 
primary care doctor.  If you were prescribed a narcotic here in the emergency 
room or any type of controlled substances you're not allowed to drive while 
taking this medication or operate any type of heavy machinery.  Narcotics can 
make you feel lightheaded dizziness nausea and can cause constipation.  You may 
need to  a stool softener.  Thank you for choosing [facility] 
emergency room.  Please return to the emergency room immediately if you have any
other concerns worsening of symptoms.
Departure Forms:
Customer Survey
General Discharge Information
Prescriptions:
Current Visit Scripts
Oxycodone HCl/Acetaminophen (Percocet  MG Tablet) 1 TAB PO 4 TIMES/DAY PRN
pain 
     #10 TAB 
 
 
(BLACK PA-C,VELVET)
 
PA/NP Co-Sign Statement
Statement:
ED Attending supervision documentation-
 
[] I saw and evaluated the patient. I have also reviewed all the pertinent lab 
results and diagnostic results. I agree with the findings and the plan of care 
as documented in the PA's/NP's documentation. 
 
[X] I have reviewed the ED Record and agree with the PA's/NP's documentation.
 
[] Additions or exceptions (if any) to the PAs/NP's note and plan are 
summarized below:
[]
 
(KIKO BRADLEY,ISRAEL ASH)

## 2018-02-15 ENCOUNTER — HOSPITAL ENCOUNTER (OUTPATIENT)
Dept: HOSPITAL 68 - STS | Age: 58
End: 2018-02-15
Attending: UROLOGY
Payer: COMMERCIAL

## 2018-02-15 DIAGNOSIS — N35.9: Primary | ICD-10-CM

## 2018-02-15 DIAGNOSIS — F17.200: ICD-10-CM

## 2018-02-15 DIAGNOSIS — I10: ICD-10-CM

## 2018-02-15 DIAGNOSIS — R39.16: ICD-10-CM

## 2018-02-15 DIAGNOSIS — Z79.84: ICD-10-CM

## 2018-02-15 DIAGNOSIS — E11.9: ICD-10-CM

## 2018-02-15 DIAGNOSIS — N40.1: ICD-10-CM

## 2018-02-15 DIAGNOSIS — Z87.442: ICD-10-CM

## 2018-02-15 DIAGNOSIS — Z85.51: ICD-10-CM

## 2018-02-15 DIAGNOSIS — Z79.4: ICD-10-CM

## 2018-02-15 NOTE — OPERATIVE REPORT
Operative/Inv Procedure Report
Surgery Date: 02/15/18
Name of Procedure:
cystoscopy: urethral stricture dilatation
Pre-Operative Diagnosis:
hx caBladder.urethral stricture.  bladder stone
Post-Operative Diagnosis:
no bladder stone
Estimated Blood Loss: scant
Surgeon/Assistant:
Justino Mc MD
 
Anesthesia: moderate sedation
Complications:
none
 
Operative/Procedure Note
Note:
The patient was taken to the operative room and placed on the OR table in supine
position.  Timeout was performed in order to confirm the patient's identity, 
procedure, anesthesia, antibiotics, as well as any other pertinent information. 
 
 
After adequate anesthesia, and antibiotics, the patient was then placed 
lithotomy stirrups 
draped and prepped in the usual surgical fashion.  A 17 Ukrainian cystoscope sheath
, with a 30 angle lens, was inserted into the urethra for initial dilatations; 
subsequent dilatation with 19, and 22fr sheaths under direct vision was then 
performed successfully.  The 25fr, sheath with 30 degree lense was then advanced
for further dilatation, and into the bladder without difficulty.  Thorough and 
systematic visualization of the bladder revealed NO TUMOR, NO STONE.  BILATERAL 
CLEAR EFFLUX of urine from the  tic ureteral orifices. The bladder was then 
hydrodistended 2 with the irrigation fluid at 40 cm above the symphysis pubis. 
No evidence of tumor, increased petechiae, nor Hunner's ulceration was noted.  
Both ureteral orifices had clear reflux in their orthotopic position.  No 
terminal bleed with drainage.  Bladder capacity was normal.  The bladder was 
then drained and the cystoscope was removed under direct visualization.  
 
The patient tolerated procedure well and was taken to recovery room in 
satisfactory condition.
Findings:
bulbar stricture dilated wide open
Discharge Disposition: Same Day Admissions
CC:
Justino Mc MD

## 2018-08-12 ENCOUNTER — HOSPITAL ENCOUNTER (INPATIENT)
Dept: HOSPITAL 68 - ERH | Age: 58
LOS: 11 days | Discharge: NURSING, REHAB OR LONG-TERM CARE | DRG: 305 | End: 2018-08-23
Attending: INTERNAL MEDICINE
Payer: COMMERCIAL

## 2018-08-12 VITALS — WEIGHT: 161 LBS | BODY MASS INDEX: 24.4 KG/M2 | HEIGHT: 68 IN

## 2018-08-12 VITALS — SYSTOLIC BLOOD PRESSURE: 132 MMHG | DIASTOLIC BLOOD PRESSURE: 70 MMHG

## 2018-08-12 VITALS — SYSTOLIC BLOOD PRESSURE: 124 MMHG | DIASTOLIC BLOOD PRESSURE: 70 MMHG

## 2018-08-12 DIAGNOSIS — I96: ICD-10-CM

## 2018-08-12 DIAGNOSIS — B95.61: ICD-10-CM

## 2018-08-12 DIAGNOSIS — Z79.4: ICD-10-CM

## 2018-08-12 DIAGNOSIS — F20.9: ICD-10-CM

## 2018-08-12 DIAGNOSIS — J90: ICD-10-CM

## 2018-08-12 DIAGNOSIS — M86.8X7: ICD-10-CM

## 2018-08-12 DIAGNOSIS — L97.518: ICD-10-CM

## 2018-08-12 DIAGNOSIS — F12.929: ICD-10-CM

## 2018-08-12 DIAGNOSIS — E11.40: ICD-10-CM

## 2018-08-12 DIAGNOSIS — M86.171: Primary | ICD-10-CM

## 2018-08-12 DIAGNOSIS — N40.0: ICD-10-CM

## 2018-08-12 DIAGNOSIS — B96.89: ICD-10-CM

## 2018-08-12 DIAGNOSIS — Z86.73: ICD-10-CM

## 2018-08-12 DIAGNOSIS — Z85.51: ICD-10-CM

## 2018-08-12 DIAGNOSIS — F10.20: ICD-10-CM

## 2018-08-12 DIAGNOSIS — E11.52: ICD-10-CM

## 2018-08-12 DIAGNOSIS — E11.69: ICD-10-CM

## 2018-08-12 DIAGNOSIS — B96.1: ICD-10-CM

## 2018-08-12 DIAGNOSIS — F17.210: ICD-10-CM

## 2018-08-12 DIAGNOSIS — E11.621: ICD-10-CM

## 2018-08-12 DIAGNOSIS — B95.0: ICD-10-CM

## 2018-08-12 DIAGNOSIS — Z91.14: ICD-10-CM

## 2018-08-12 LAB
ABSOLUTE GRANULOCYTE CT: 5.4 /CUMM (ref 1.4–6.5)
BASOPHILS # BLD: 0 /CUMM (ref 0–0.2)
BASOPHILS NFR BLD: 0.2 % (ref 0–2)
EOSINOPHIL # BLD: 0.1 /CUMM (ref 0–0.7)
EOSINOPHIL NFR BLD: 1.1 % (ref 0–5)
ERYTHROCYTE [DISTWIDTH] IN BLOOD BY AUTOMATED COUNT: 12.4 % (ref 11.5–14.5)
GRANULOCYTES NFR BLD: 73.6 % (ref 42.2–75.2)
HCT VFR BLD CALC: 41.8 % (ref 42–52)
LYMPHOCYTES # BLD: 1.2 /CUMM (ref 1.2–3.4)
MCH RBC QN AUTO: 29.3 PG (ref 27–31)
MCHC RBC AUTO-ENTMCNC: 32.8 G/DL (ref 33–37)
MCV RBC AUTO: 89.4 FL (ref 80–94)
MONOCYTES # BLD: 0.7 /CUMM (ref 0.1–0.6)
PLATELET # BLD: 353 /CUMM (ref 130–400)
PMV BLD AUTO: 7.9 FL (ref 7.4–10.4)
RED BLOOD CELL CT: 4.68 /CUMM (ref 4.7–6.1)
WBC # BLD AUTO: 7.4 /CUMM (ref 4.8–10.8)

## 2018-08-12 PROCEDURE — C1769 GUIDE WIRE: HCPCS

## 2018-08-12 PROCEDURE — A9579 GAD-BASE MR CONTRAST NOS,1ML: HCPCS

## 2018-08-12 PROCEDURE — 87075 CULTR BACTERIA EXCEPT BLOOD: CPT

## 2018-08-12 PROCEDURE — 2NASP: CPT

## 2018-08-12 PROCEDURE — 87070 CULTURE OTHR SPECIMN AEROBIC: CPT

## 2018-08-12 PROCEDURE — 75659: CPT

## 2018-08-12 NOTE — RADIOLOGY REPORT
EXAMINATION:
XR CHEST
 
CLINICAL INFORMATION:
Cough. Presumptive diagnosis of pneumonia.
 
COMPARISON:
Chest x-ray dated 6/7/2017.
 
TECHNIQUE:
2 views of the chest were obtained.
 
FINDINGS:
The cardiomediastinal silhouette is within normal limits in size.
 
Lungs bilaterally are symmetrically expanded and clear. No focal
consolidation, effusion or pneumothorax is seen.
 
Bony structures are unremarkable.
 
IMPRESSION:
Unremarkable examination. No evidence of focal pneumonia.

## 2018-08-12 NOTE — HISTORY & PHYSICAL
Werner BRADLEY,Adrianachito 08/12/18 1141:
General Information and HPI
MD Statement:
I have seen and personally examined ITA VARNER and documented this H&P.
 
The patient is a 57 year old M who presented with a patient stated chief 
complaint of [rle PAIN].
 
Source of Information: patient, old records
Exam Limitations: no limitations
History of Present Illness:
This is a 56 yo male with PMH of BPH, schizophrenia ("I hear voices"), poorly 
compliant DM, CVA, ?bladder ca who comes in for CC of RLE pain. He stated that 
for the past week he note his 4th digit of his RLE slowly turn black, with foul 
smelling drainage and worsening pain. He was BIBA for pain in that extremity. 
Denies recent trauma or open wounds. States that chronicity of lesion has been 
one week.
 
He states that he has a hard time coming to terms of his dx of DM. He has 
absolutely no idea about his medication regimen. He thinks that his long acting 
insulin is taken TID with meals, he does not check blood sugars, but when he 
does its always high.  He brought in a bag of meds with bactrim and Keflex 
prescribed in April 2018 by Dr. Alejandra, yet they are virtually untouched. Per 
Med rec he is on several psych meds including trazadone, risperdal, bupropion, 
and klonopin but he can't tell me what med he takes. Denies fevers, ha,n,v,d or 
change in bladder or bowel movement. His only complaint is pain in his 3rd and 
4th digits in his RLE. 
 
Allergies/Medications
Allergies:
Coded Allergies:
No Known Drug Allergies (Intermediate, NONE 06/06/17)
 
 
Past History
 
Travel History
Traveled to [place] past 21 day No
 
Medical History
Neurological: altered mental status
EENT: NONE
Cardiovascular: NONE
Respiratory: Pleural effusion
Gastrointestinal: NONE
Hepatic: NONE
Renal: benign prost hyperplasia
Musculoskeletal: NONE
Psychiatric: schizophrenia
Endocrine: diabetes
Blood Disorders: NONE
Cancer(s): bladder cancer
GYN/Reproductive: NONE
Other Medical Hx:
Schizophrenia diabetes neuropathy pleural effusion
History of MRSA: No
History of VRE: No
History of CDIFF: No
 
Surgical History
Surgical History: non-contributory
 
Past Family/Social History
 
Psychosocial History
Who Do You Live With? self
Services at Home: Occupational Therapy
Primary Language: English
ETOH Use: occasional use
Illicit Drug Use: denies illicit drug use
Living Will? unknown
Power of /HCP? unknown
Name of POA/HCP: unknown
 
Functional Ability
ADLs
Independent: dressing, eating, toileting, bathing. 
Ambulation: walker
IADLs
Independent: telephone. 
 
Review of Systems
 
Review of Systems
Constitutional:
Reports: see HPI. 
 
Exam & Diagnostic Data
Last 24 Hrs of Vital Signs/I&O
 Vital Signs
 
 
Date Time Temp Pulse Resp B/P B/P Pulse O2 O2 Flow FiO2
 
     Mean Ox Delivery Rate 
 
08/12 1433  70  132/70     
 
08/12 1330 98.1 73 20 132/70  98 Room Air  
 
08/12 1229 98.4 78 17 131/70  99 Room Air  
 
08/12 1107 98.2 84 15 114/63  98 Room Air Room Air 
 
08/12 0923 98.2 99 18 108/68  95 Room Air  
 
 
 Intake & Output
 
 
 08/12 1600 08/12 0800 08/12 0000
 
Intake Total 580  
 
Output Total   
 
Balance 580  
 
    
 
Intake,   
 
Intake, Oral 80  
 
Patient 72.575 kg  
 
Weight   
 
Weight Reported by Patient  
 
Measurement   
 
Method   
 
 
 
 
Physical Exam
General Appearance Alert, Oriented X3, Cooperative, No Acute Distress
Skin No Significant Lesion
HEENT Atraumatic, PERRLA, EOMI
Neck Supple
Cardiovascular Regular Rate, Normal S1, Normal S2
Lungs Normal Air Movement
Abdomen Soft, No Tenderness
Neurological Normal Speech
Extremities thre is black 3rd toe on RLE no open wound. no purulence. tender to 
palpation. Pt has sensation in bilat LE.
Last 24 Hrs of Labs/Fer:
 Laboratory Tests
 
08/12/18 1700:
Urine Color YEL, Urine Clarity CLEAR, Urine pH 6.0, Ur Specific Gravity 1.015, 
Urine Protein NEG, Urine Ketones NEG, Urine Nitrite NEG, Urine Bilirubin NEG, 
Urine Urobilinogen 0.2, Ur Leukocyte Esterase NEG, Ur Microscopic EXAM NOT 
REQUIRED, Urine Hemoglobin NEG, Urine Glucose >=1000  H
 
08/12/18 1228:
Lactic Acid Cancelled
 
08/12/18 0937:
Anion Gap 10, Estimated GFR > 60, BUN/Creatinine Ratio 20.0, Glucose 327  H, 
Lactic Acid 1.3, Calcium 9.9, Total Bilirubin 0.7, AST 17, ALT 19  L, Alkaline 
Phosphatase 98, Troponin I < 0.01, Total Protein 7.6, Albumin 3.9, Globulin 3.7,
Albumin/Globulin Ratio 1.1, CBC w Diff NO MAN DIFF REQ, RBC 4.68  L, MCV 89.4, 
MCH 29.3, MCHC 32.8  L, RDW 12.4, MPV 7.9, Gran % 73.6, Lymphocytes % 15.9  L, 
Monocytes % 9.2, Eosinophils % 1.1, Basophils % 0.2, Absolute Granulocytes 5.4, 
Absolute Lymphocytes 1.2, Absolute Monocytes 0.7  H, Absolute Eosinophils 0.1, 
Absolute Basophils 0
 Microbiology
08/12 0937  BLOOD: Blood Culture - RECD
08/12 0932  BLOOD: Blood Culture - RECD
 
 
 
Assessment/Plan
Assessment:
This is a 56 yo male with PMH of HTN, BPH, DM, ?bladder mass who comes in for CC
of RLE pain.  RLE xray in ED showed "Rarefaction of the bones of the middle and 
distal phalanx of the fourth digit is seen with large lytic lesion and cortical 
erosion along the medial aspect of the middle phalanx and
medial base of the distal phalanx, suspicious for osteomyelitis " with likely 
exension into the joint. Pt is afebrile without leukocytosis and was given one 
dose of ceftaz/vanco in ED. 
 
PLAN:
 
Osteomyelitis and dry gangrene on 4th digit in RLE: 
* Appreciate podiatry input
* Given lack of fever, leukocytosis and hemodynamic instability will hold off 
abx hoping for culture results. However, he already got one dose of abx in ED.
* NPO in midnight, just in case for procedure in AM
* consider wound consult in AM
* BCX
* pain mgmt
 
DM:
* RISS
* CC3
* FS
* Needs diabetic education
* HBA1C
* Con't low  dose Lisinopril
* Hold home regimen
 
Schizophrenia:
* Need to get records for his psych meds. He has no idea of his usual regimen.
* Holdling off his all meds at this time
 
fc
chem ppx
CC3
 
As Ranked By This Provider
Problem List:
 1. Osteomyelitis
   Qualifiers
 Osteomyelitis type: unspecified type Osteomyelitis location: foot Laterality: 
right Qualified Code: M86.9 - Osteomyelitis, unspecified
 
 
Core Measures/Misc (9/17)
 
Acute Coronary Syndrome
ACS Diagnosis: No
 
Congestive Heart Failure
Congestive Heart Failure Diagnosis No
 
Cerebrovascular Accident
CVA/TIA Diagnosis: No
 
VTE (View Protocol)
VTE Risk Factors Acute Medical Illness
No Mechanical VTE Prophylaxis d/t N/A MechProphylax Ordered
No VTE Pharm Prophylaxis d/t NA PharmProphylax ordered
 
Sepsis (View protocol)
Sepsis Present: No
If YES complete Sepsis Event Note If YES complete Sepsis Event Note
 
Seda BRADLEY,Amir 08/12/18 1929:
General Information and HPI
 
Allergies/Medications
Home Med list
Acetaminophen (Tylenol Extra Strength) 500 MG TABLET   1-2 TAB PO TID PRN PAIN 
Aspirin (Aspirin*) 81 MG TAB.CHEW   81 MG PO DAILY HEART HEALTH
Atorvastatin Calcium 40 MG TABLET   1 TAB PO DAILY CHOLESTEROL  (Reported)
Bupropion HCl (Bupropion XL) 300 MG TAB.ER.24H   1 TAB PO DAILY MENTAL HEALTH  (
Reported)
Clonazepam 1 MG TABLET   1 TAB PO TID ANXIETY/SLEEP  (Reported)
Folic Acid 1 MG TABLET   1 TAB PO DAILY SUPPLEMENT  (Reported)
Gabapentin 300 MG CAPSULE   1 CAP PO TID MENTAL HEALTH  (Reported)
Ibuprofen 600 MG TABLET   1 TAB PO TID PRN PAIN
     with food
Insulin Glargine,Hum.rec.anlog (Toujeo Solostar) (Unknown Strength) INSULN.PEN  
40 UNITS SC DAILY DM  (Reported)
Insulin Lispro (Humalog Kwikpen U-100) 100 UNIT/ML INSULN.PEN   8 UNITS SC TIDAC
DM  (Reported)
Lisinopril 2.5 MG TABLET   1 TAB PO DAILY BP  (Reported)
Metoclopramide HCl (Reglan) 10 MG TABLET   1 TAB PO 4 TIMES/DAY PRN HEADACHE/
NAUSEA 
     30 minutes before meals and bedtime
Risperidone 4 MG TABLET   1 TAB PO BID MENTAL HEALTH  (Reported)
Sitagliptin Phos/Metformin HCl (Janumet 50-1,000 MG Tablet) 50 MG-1,000 MG 
TABLET   1 TAB PO BID DM  (Reported)
 
 
 
Core Measures/Misc (9/17)
 
Sepsis (View protocol)
If YES complete Sepsis Event Note If YES complete Sepsis Event Note
 
Attending MD Review Statement
 
Attending Statement
Attending MD Statement: examined this patient, discuss w/resident/PA/NP, agreed 
w/resident/PA/NP, reviewed EMR data (avail)
Attending Assessment/Plan:
Ms. Varner was seen and evaluated.  H&P reviewed.  56 yo M with h/o DM, 
Schizophrenia a/w Rt 4th necrotic toe likely c/w osteo
--agree with Podiatry eval
--rest of the plan as per resident's note

## 2018-08-12 NOTE — ADMISSION CERTIFICATION
Admission Certification
 
Certification Statement
- As attending physician, I certify that at the time of
- admission, based on clinical presentation, severity of
- symptoms, need for further diagnostic testing and
- therapeutic interventions, and risk of adverse outcomes
- without in-hospital treatment, in my clinical assessment,
- this patient requires an acute hospital stay for a minimum
- of two nights or longer. I have also considered psychsocial
- factors such as support system, advanced age, financial
- issues, cognitive issues, and failed out-patient treatments,
- past re-admission history, safety of patient, and lack of
- compliance as applicable.
Specific rationale supporting this admission is:
diabetes, necrotic toe

## 2018-08-12 NOTE — RADIOLOGY REPORT
EXAMINATION:
CR FOOT, RIGHT
 
CLINICAL INFORMATION:
Right fourth toe inflamed. Question osteomyelitis.
 
COMPARISON:
None
 
TECHNIQUE:
AP, lateral, and oblique views of the right foot.
 
FINDINGS:
There is soft tissue swelling of the fourth digit. Rarefaction of the bones
of the middle and distal phalanx of the fourth digit is seen with large lytic
lesion and cortical erosion along the medial aspect of the middle phalanx and
medial base of the distal phalanx, suspicious for osteomyelitis. There is
likely extension of the process into the joint. No definite radiopaque
foreign body is seen. There is a punctate density, likely a small bone
fragment along the lateral margin of the DIP joint of the fourth digit.
 
There is likely some soft tissue swelling seen involving the distal tips of
the first, second, third, and fifth toes as well. The underlying bones,
hilar, appear intact. There is diffuse osteopenia with particular prominence
in the periarticular regions. There is moderate degenerative disease at the
first MTP joint with mild hallux valgus deformity and overlying soft tissue
swelling. Mild degenerative changes are seen at the interphalangeal joints of
all digits and at the talonavicular joint. No significant ankle joint
effusion is seen.
 
IMPRESSION:
 
1. Large lytic lesion with significant cortical and medullary bone erosion
and resorption in the middle phalanx of the fourth digit and at the medial
base of the distal phalanx of the fourth digit, suspicious for septic joint
and osteomyelitis.
2. Other nonspecific soft tissue swelling over the distal toes is seen
without underlying acute bone findings.
3. Mild to moderate degenerative changes in the forefoot.

## 2018-08-12 NOTE — ED GENERAL ADULT
**See Addendum**
History of Present Illness
 
General
Chief Complaint: General Adult
Stated Complaint: BIBA R TOE WOUND
Source: patient, old records, EMS
Exam Limitations: no limitations
 
Vital Signs & Intake/Output
Vital Signs & Intake/Output
 Vital Signs
 
 
Date Time Temp Pulse Resp B/P B/P Pulse O2 O2 Flow FiO2
 
     Mean Ox Delivery Rate 
 
08/12 0923 98.2 99 18 108/68  95 Room Air  
 
 
 
Allergies
Coded Allergies:
No Known Drug Allergies (Intermediate, NONE 06/06/17)
 
Reconcile Medications
Acetaminophen (Tylenol Extra Strength) 500 MG TABLET   1-2 TAB PO TID PRN PAIN 
Aspirin (Aspirin*) 81 MG TAB.CHEW   81 MG PO DAILY HEART HEALTH
Atorvastatin Calcium 40 MG TABLET   1 TAB PO DAILY CHOLESTEROL  (Reported)
Bupropion HCl (Bupropion XL) 300 MG TAB.ER.24H   1 TAB PO DAILY MENTAL HEALTH  (
Reported)
Butalb/Acetaminophen/Caffeine (Kuelio-Hfyvvlpl-Hnsg -40) 50 MG-325 MG-40 
MG TABLET   1 TAB PO Q6P PRN headche
Cephalexin (Keflex) 500 MG CAPSULE   1 CAP PO 4 TIMES/DAY INFECTION
Clonazepam 1 MG TABLET   1 TAB PO TID ANXIETY/SLEEP  (Reported)
Folic Acid 1 MG TABLET   1 TAB PO DAILY SUPPLEMENT  (Reported)
Gabapentin 300 MG CAPSULE   1 CAP PO TID MENTAL HEALTH  (Reported)
Ibuprofen 600 MG TABLET   1 TAB PO TID PRN PAIN
     with food
Insulin Glargine,Hum.rec.anlog (Toujeo Solostar) (Unknown Strength) INSULN.PEN  
40 UNITS SC DAILY DM  (Reported)
Insulin Lispro (Humalog Kwikpen U-100) 100 UNIT/ML INSULN.PEN   8 UNITS SC TIDAC
DM  (Reported)
Lisinopril 2.5 MG TABLET   1 TAB PO DAILY BP  (Reported)
Metoclopramide HCl (Reglan) 10 MG TABLET   1 TAB PO 4 TIMES/DAY PRN HEADACHE/
NAUSEA 
     30 minutes before meals and bedtime
Risperidone 4 MG TABLET   1 TAB PO BID MENTAL HEALTH  (Reported)
Risperidone Microspheres (Risperdal Consta) 50 MG/2 ML SYRINGE   50 MG INJ Q14D 
UNKNOWN  (Reported)
Sitagliptin Phos/Metformin HCl (Janumet 50-1,000 MG Tablet) 50 MG-1,000 MG 
TABLET   1 TAB PO BID DM  (Reported)
Sulfamethoxazole/Trimethoprim (Bactrim Ds Tablet) 800 MG-160 MG TABLET   1 TAB 
PO BID INFECION
Trazodone HCl 100 MG TABLET   2 TAB PO QHS MENTAL HEALTH  (Reported)
 
Triage Note:
56 Y/O MALE BIBA FROM HOME FOR EVAL OF R TOE
 WOUND.  PT STATES HE HAS HAD "BLACK" TO 4TH TOE X
 MANY MONTHS.  TOE BLACK/NECROTIC WITH PEELING
 SKIN.  HX DIABETES.
 MD INTO EVAL
Triage Nurses Notes Reviewed? yes
HPI:
Patient brought in by ambulance after increasing pain and swelling to his right 
fourth toe.  Patient states that he began to notice it a few days ago but then 
this morning he noticed a foul order and that the skin is come off.  Patient is 
concerned because he is diabetic.  Patient states that he has a throbbing pain 
that is 8 out of 10 that radiates into his foot and it increases with 
ambulation.  Patient denies any fevers or chills.
 
Past History
 
Travel History
Traveled to [place] past 21 day No
 
Medical History
Any Pertinent Medical History? see below for history
Neurological: altered mental status
EENT: NONE
Cardiovascular: NONE
Respiratory: Pleural effusion
Gastrointestinal: NONE
Hepatic: NONE
Renal: benign prost hyperplasia
Musculoskeletal: NONE
Psychiatric: schizophrenia
Endocrine: diabetes
Blood Disorders: NONE
Cancer(s): bladder cancer
GYN/Reproductive: NONE
Other Medical Hx:
Schizophrenia diabetes neuropathy pleural effusion
History of MRSA: No
History of VRE: No
History of CDIFF: No
 
Surgical History
Surgical History: non-contributory
 
Psychosocial History
[facility]
What is your primary language English
Tobacco Use: Current Daily Use
Daily Tobacco Use Amount/Type: => 5 Cigarettes daily
ETOH Use: occasional use
Illicit Drug Use: denies illicit drug use
 
Family History
Hx Contributory? No
 
Review of Systems
 
Review of Systems
Constitutional:
Reports: no symptoms. 
EENTM:
Reports: no symptoms. 
Respiratory:
Reports: no symptoms. 
Cardiovascular:
Reports: no symptoms. 
GI:
Reports: no symptoms. 
Genitourinary:
Reports: no symptoms. 
Musculoskeletal:
Reports: see HPI. 
Skin:
Reports: no symptoms. 
Neurological/Psychological:
Reports: no symptoms. 
Hematologic/Endocrine:
Reports: no symptoms. 
Immunologic/Allergic:
Reports: no symptoms. 
All Other Systems: Reviewed and Negative
 
Physical Exam
 
Physical Exam
General Appearance: well developed/nourished, alert, awake, anxious, mild 
distress
Head: atraumatic, normal appearance
Eyes:
Bilateral: PERRL, EOMI. 
Ears, Nose, Throat: normal pharynx, normal ENT inspection, hearing grossly 
normal
Neck: normal inspection, supple, full range of motion
Respiratory: normal breath sounds, chest non-tender, no respiratory distress, 
rhonchi (SCATTERED)
Cardiovascular: regular rate/rhythm, normal peripheral pulses
Peripheral Pulses:
1+ tibialis posterior (R), 1+ tibialis posterior (L), 1+ dorsalis pedis (R), 1+ 
dorsalis pedis (L)
Gastrointestinal: normal bowel sounds, soft, non-tender, no organomegaly
Back: normal inspection, normal range of motion
Extremities: rIGHT FOURTH TOE SWOLLEN WITH A NECROTIC TIP AND THE SKIN OFF, 
POSITIVE ERYTHEMA, POSITIVE BOWEL ODER
Neurologic/Psych: awake, alert, oriented x 3, normal mood/affect, motor/sensory 
deficits
Skin: intact, normal color, warm/dry
 
Core Measures
ACS in differential dx? No
CVA/TIA Diagnosis: No
Sepsis Present: Yes
Sepsis Focused Exam Completed? Yes
 
Progress
Differential Diagnoses
I considered the following diagnoses in my evaluation of the patient: 
 
Plan of Care:
 Orders
 
 
Procedure Date/time Status
 
Nothing by Mouth 08/12 L Active
 
LACTIC ACID 08/12 1228 Active
 
ED Holding Orders 08/12 1022 Active
 
Admit to inpatient 08/12 1022 Active
 
Vital Signs 08/12 1022 Active
 
Code Status 08/12 1022 Active
 
XRY-FOOT COMPLETE, RIGHT 08/12 0928 Active
 
XRY-CHEST XRAY, TWO VIEWS 08/12 0928 Active
 
BLOOD CULTURE 08/12 0928 Active
 
URINALYSIS 08/12 0928 Active
 
TROPONIN LEVEL 08/12 0928 Complete
 
LACTIC ACID 08/12 0928 Complete
 
COMPREHENSIVE METABOLIC PANEL 08/12 0928 Complete
 
CBC WITHOUT DIFFERENTIAL 08/12 0928 Complete
 
EKG 08/12 0928 Active
 
 
 Laboratory Tests
 
 
08/12/18 0937:
Anion Gap 10, Estimated GFR > 60, BUN/Creatinine Ratio 20.0, Glucose 327  H, 
Lactic Acid 1.3, Calcium 9.9, Total Bilirubin 0.7, AST 17, ALT 19  L, Alkaline 
Phosphatase 98, Troponin I < 0.01, Total Protein 7.6, Albumin 3.9, Globulin 3.7,
Albumin/Globulin Ratio 1.1, CBC w Diff NO MAN DIFF REQ, RBC 4.68  L, MCV 89.4, 
MCH 29.3, MCHC 32.8  L, RDW 12.4, MPV 7.9, Gran % 73.6, Lymphocytes % 15.9  L, 
Monocytes % 9.2, Eosinophils % 1.1, Basophils % 0.2, Absolute Granulocytes 5.4, 
Absolute Lymphocytes 1.2, Absolute Monocytes 0.7  H, Absolute Eosinophils 0.1, 
Absolute Basophils 0
 Microbiology
08/12 0937  BLOOD: Blood Culture - RECD
08/12 0932  BLOOD: Blood Culture - RECD
 
 
Diagnostic Imaging:
Viewed by Me: Radiology Read.  Discussed w/RAD: Radiology Read. 
Initial ED EKG: NSR, nonspecific ST T wave chg
Prior EKG: unchanged
Comments:
D/W DR. BROCK, HE WILL CONSULT
 
Departure
 
Departure
Disposition: STILL A PATIENT
Condition: Stable
Clinical Impression
Primary Impression: Osteomyelitis
Qualifiers:  Osteomyelitis type: unspecified type Osteomyelitis location: foot 
Laterality: right Qualified Code: M86.9 - Osteomyelitis, unspecified
Referrals:
Karo Pierre (PCP/Family)
 
Departure Forms:
Customer Survey
General Discharge Information
 
Admission Note
Spoke With:
Seda BRADLEY,Amir
Documentation of Exam:
Documentation of any treatments & extenuating circumstances including Concerns 
Regarding Discharge (functional status, medication knowledge or non-compliance, 
living conditions, etc.) that warrant an admission rather than observation: [
Osteomyelitis in the setting of a diabetic foot, prior to consultation for 
possible toe amputation, IV antibiotics, glucose control, IV fluids, pain 
control, smoking cessation]
 
 
Critical Care Note
 
Critical Care Note
Critical Care Time: non-applicable
 
Documentation of Exam:
Documentation of any treatments & extenuating circumstances including Concerns 
Regarding Discharge (functional status, medication knowledge or non-compliance, 
living conditions, etc.) that warrant an admission rather than observation: [
Osteomyelitis in the setting of a diabetic foot, prior to consultation for 
possible toe amputation, IV antibiotics, glucose control, IV fluids, pain 
control, smoking cessation]

## 2018-08-13 VITALS — SYSTOLIC BLOOD PRESSURE: 110 MMHG | DIASTOLIC BLOOD PRESSURE: 70 MMHG

## 2018-08-13 VITALS — DIASTOLIC BLOOD PRESSURE: 60 MMHG | SYSTOLIC BLOOD PRESSURE: 100 MMHG

## 2018-08-13 VITALS — SYSTOLIC BLOOD PRESSURE: 112 MMHG | DIASTOLIC BLOOD PRESSURE: 60 MMHG

## 2018-08-13 LAB
ABSOLUTE GRANULOCYTE CT: 5.6 /CUMM (ref 1.4–6.5)
BASOPHILS # BLD: 0 /CUMM (ref 0–0.2)
BASOPHILS NFR BLD: 0.5 % (ref 0–2)
EOSINOPHIL # BLD: 0.1 /CUMM (ref 0–0.7)
EOSINOPHIL NFR BLD: 1.7 % (ref 0–5)
ERYTHROCYTE [DISTWIDTH] IN BLOOD BY AUTOMATED COUNT: 12.6 % (ref 11.5–14.5)
GRANULOCYTES NFR BLD: 75.2 % (ref 42.2–75.2)
HCT VFR BLD CALC: 37.2 % (ref 42–52)
LYMPHOCYTES # BLD: 1.2 /CUMM (ref 1.2–3.4)
MCH RBC QN AUTO: 29.9 PG (ref 27–31)
MCHC RBC AUTO-ENTMCNC: 32.9 G/DL (ref 33–37)
MCV RBC AUTO: 90.8 FL (ref 80–94)
MONOCYTES # BLD: 0.5 /CUMM (ref 0.1–0.6)
PLATELET # BLD: 308 /CUMM (ref 130–400)
PMV BLD AUTO: 8.2 FL (ref 7.4–10.4)
RED BLOOD CELL CT: 4.09 /CUMM (ref 4.7–6.1)
WBC # BLD AUTO: 7.5 /CUMM (ref 4.8–10.8)

## 2018-08-13 NOTE — MRI REPORT
EXAMINATION:
MRI FOOT WITHOUT/WITH CONTRAST, RIGHT
 
CLINICAL INFORMATION:
57-year-old male with inflammation of fourth toe. Evaluate for osteomyelitis.
 
 
COMPARISON:
Foot radiographs, 08/12/2018.
 
TECHNIQUE:
Multiplanar, multisequence MR imaging examination of the right foot was
performed on a high-field 1.5 Francie magnet.  Images were acquired prior to
and following intravenous administration of 7 mL of Gadavist contrast
material.
 
FINDINGS:
Several imaging sequences are degraded in quality by patient motion.
 
At the great toe metatarsophalangeal joint, there is hallux valgus,
nonuniform loss of articular cartilage, subarticular bone marrow edema and
osteophyte formation. Subchondral cyst is present in the base of the 1st
proximal phalanx. The subchondral sclerosis and subchondral marrow edema of
the plantar aspect of the 1st metatarsal head are consistent with stress
related changes at the degenerated joint. Also, bone marrow edema is present
within the medial and lateral sesamoids. No evidence of stress fracture.
 
At the fourth toe, there is marrow edema and marrow enhancement of the middle
and distal phalanges which exhibit cortical erosions (which are more clearly
observed on radiographs of 08/12/2018). Also, there is marrow edema and
enhancement of the head of the proximal phalanx. The fatty marrow signal is
well-preserved within the base of this phalanx and within the 4th metatarsal.
 
 
Diffuse soft tissue edema of the foot. No focal rim-enhancing fluid
collection. The visualized distal plantar aponeurosis is intact. The flexor
and extensor tendons are intact. No evidence of tendon tear or tenosynovitis.
 
IMPRESSION:
- Findings consistent with osteomyelitis involving the distal and middle
phalanges, as well as head of the proximal phalanx, of the fourth toe.
- Soft tissue edema/cellulitis without abscess.
- Hallux valgus and moderate osteoarthritis of great toe metatarsophalangeal
joint.

## 2018-08-14 VITALS — SYSTOLIC BLOOD PRESSURE: 118 MMHG | DIASTOLIC BLOOD PRESSURE: 68 MMHG

## 2018-08-14 VITALS — DIASTOLIC BLOOD PRESSURE: 62 MMHG | SYSTOLIC BLOOD PRESSURE: 124 MMHG

## 2018-08-14 VITALS — SYSTOLIC BLOOD PRESSURE: 114 MMHG | DIASTOLIC BLOOD PRESSURE: 80 MMHG

## 2018-08-14 VITALS — SYSTOLIC BLOOD PRESSURE: 120 MMHG | DIASTOLIC BLOOD PRESSURE: 70 MMHG

## 2018-08-14 LAB
ABSOLUTE GRANULOCYTE CT: 5.8 /CUMM (ref 1.4–6.5)
BASOPHILS # BLD: 0 /CUMM (ref 0–0.2)
BASOPHILS NFR BLD: 0.4 % (ref 0–2)
EOSINOPHIL # BLD: 0.2 /CUMM (ref 0–0.7)
EOSINOPHIL NFR BLD: 2.4 % (ref 0–5)
ERYTHROCYTE [DISTWIDTH] IN BLOOD BY AUTOMATED COUNT: 12.1 % (ref 11.5–14.5)
GRANULOCYTES NFR BLD: 74.1 % (ref 42.2–75.2)
HCT VFR BLD CALC: 30.6 % (ref 42–52)
LYMPHOCYTES # BLD: 1.2 /CUMM (ref 1.2–3.4)
MCH RBC QN AUTO: 30.1 PG (ref 27–31)
MCHC RBC AUTO-ENTMCNC: 33.4 G/DL (ref 33–37)
MCV RBC AUTO: 90.2 FL (ref 80–94)
MONOCYTES # BLD: 0.5 /CUMM (ref 0.1–0.6)
PLATELET # BLD: 260 /CUMM (ref 130–400)
PMV BLD AUTO: 8.3 FL (ref 7.4–10.4)
RED BLOOD CELL CT: 3.4 /CUMM (ref 4.7–6.1)
WBC # BLD AUTO: 7.8 /CUMM (ref 4.8–10.8)

## 2018-08-14 PROCEDURE — 0Y6M0ZD DETACHMENT AT RIGHT FOOT, PARTIAL 4TH RAY, OPEN APPROACH: ICD-10-PCS | Performed by: PODIATRIST

## 2018-08-14 PROCEDURE — 3E0T3BZ INTRODUCTION OF ANESTHETIC AGENT INTO PERIPHERAL NERVES AND PLEXI, PERCUTANEOUS APPROACH: ICD-10-PCS | Performed by: PODIATRIST

## 2018-08-14 PROCEDURE — 0JBQ0ZZ EXCISION OF RIGHT FOOT SUBCUTANEOUS TISSUE AND FASCIA, OPEN APPROACH: ICD-10-PCS | Performed by: PODIATRIST

## 2018-08-14 NOTE — PN- HOUSESTAFF
Heron Ellis 18 0917:
Subjective
Follow-up For:
Osteomyelitis
Subjective:
Afebrile overnight. Patient is asleep in bed but is easily awakened. Patient 
acknowledges the persistent pain in his right foot, that has been present since 
yesterday. Patient spoke with Dr. Jaramillo and knows he will be going for 
surgery on his foot later this morning. Patient otherwise denies any fevers, 
chills, chest pain, and shortness of breath.
 
Review of Systems
Constitutional:
Reports: see HPI. 
 
Objective
Last 24 Hrs of Vital Signs/I&O
 Vital Signs
 
 
Date Time Temp Pulse Resp B/P B/P Pulse O2 O2 Flow FiO2
 
     Mean Ox Delivery Rate 
 
 1439 98.3 62 18 124/62  98 Room Air  
 
 1256 97.8 74 20 118/68  98 Room Air  
 
 0747 98.5 87 20 120/70     
 
 0612 98.5 87 20 120/70  97   
 
 2033 99.3 76 16 110/70  96   
 
 
 Intake & Output
 
 
  1600  0800  0000
 
Intake Total 800 610 200
 
Output Total 775 820 350
 
Balance 25 -210 -150
 
    
 
Intake,  600 
 
Intake, Oral  10 200
 
Output, Urine 775 820 350
 
 
 
 
Physical Exam
General Appearance: Alert, Oriented X3, Cooperative, No Acute Distress
Skin: ulcer, necrotic wound of right foot
HEENT: Atraumatic
Neck: Supple, No JVD
Cardiovascular: Regular Rate, Normal S1, Normal S2
Lungs: Clear to Auscultation
Abdomen: Soft, No Tenderness
Neurological: Normal Speech
Extremities: No Edema
 
Assessment/Plan
Assessment:
MRI-RT FOOT W/O & W JOSE A - 
- Findings consistent with osteomyelitis involving the distal and middle
phalanges, as well as head of the proximal phalanx, of the fourth toe.
- Soft tissue edema/cellulitis without abscess.
- Hallux valgus and moderate osteoarthritis of great toe metatarsophalangeal
joint.
 
56 YO M with PMH of HTN, BPH, DM, ?bladder mass who comes in for CC of RLE pain.
 RLE xray in ED showed "Rarefaction of the bones of the middle and distal 
phalanx of the fourth digit is seen with large lytic lesion and cortical erosion
along the medial aspect of the middle phalanx and
medial base of the distal phalanx, suspicious for osteomyelitis " with likely 
exension into the joint. Pt is afebrile without leukocytosis and was given one 
dose of ceftaz/vanco in ED. 
 
Osteomyelitis and dry gangrene of the 4th digit in Right Lower Extremity: 
* Podiatry consulted; will proceed with surgical intervention
* Given lack of fever, leukocytosis and hemodynamic instability will hold off 
abx hoping for culture results. However, he already got one dose of ABx in ED.
* Surgical procedure with podiatry: Open Incision and Drainage of the right foot
* Pt. open to Rehab placement after surgical operation
* Blood Cx.: Gram Negative Robel, Bacillus species, started on Unasyn 3G IV q6h
* pain mgmt
 
Diabetes Mellitus:
* RISS
* FS
* Patient needs diabetic education
* HBA1C
* Continue low dose Lisinopril
* Hold home regimen
 
Schizophrenia:
* Obtain psych med records
* Holdling off his all meds at this time
 
FC
Problem List:
 1. Osteomyelitis
 
Pain Ratin
Pain Location:
right foot
Pain Goal: Pain 7 or less
Pain Plan:
na
Tomorrow's Labs & Rationales:
mi Bernardo MD,Angelo 18 1448:
Attending MD Review Statement
 
Attending Statement
Attending MD Statement: examined this patient, discuss w/resident/PA/NP, agreed 
w/resident/PA/NP, reviewed EMR data (avail), discussed with nursing, discussed 
with case mgmt, reviewed images, amended to note
Attending Assessment/Plan:
The patient was seen and discussed with house staff. To OR today with Podiatry. 
Medically stable for planned surgery.

## 2018-08-14 NOTE — OPERATIVE REPORT
Operative/Inv Procedure Report
Surgery Date: 08/14/18
Name of Procedure:
1 open incision and drainage deep to the deep fascia with exposure of the 
extensor and flexor tendon and tendon sheath multiple sites right foot
2 open partial fourth ray resection right foot
3 intraoperative administration of ankle block anesthesia
4 excisional debridement
Pre-Operative Diagnosis:
1 open necrotic wound right foot
2 osteomyelitis right
3 gangrene right foot
4 diabetic peripheral neuropathy
Post-Operative Diagnosis:
The same
Estimated Blood Loss: less than 50ml
Surgeon/Assistant:
SHARON BROCK DPM
 
Anesthesia: moderate sedation, block
 
Operative/Procedure Note
Note:
After obtaining informed consent patient brought to the operating room placed on
the operating table in supine position.  Patient was then securely fastened to 
the operating table utilizing safety belt.  After administration of IV sedation,
10 cc of 0.5% Marcaine plain was infiltrated about the patient's right ankle.  
The right foot and ankle were then scrubbed, prepped and draped in the usual 
aseptic manner.  Digitek the right foot where a large full-thickness necrotic 
was identified.  A 15 blade to sharply advise skin margins.  The dissection was 
then carried down deep to the deep fascia with exposure of the extensor and 
flexor tendon and tendon sheath multiple sites, approximately distally.  All 
necrotic, nonviable infected tissue was sharply evacuated with the movement.  
Dissection then continued down to the periosteum overlying the fourth metatarsal
was incised and reflected.  A through and through osteotomy was then performed 
in the distal osseous segment was freed and passed from the operative field.  
Specimen was sent for both microbiologic and pathologic inspection.  The wound 
was then irrigated with 3 L of normal sterile saline and 50-50 as needed to 
bacitracin.  Next, the foot was redraped and the surgeons top of the tragically 
lost.  Any bleeding vessels identified were cauterized or ligated as 
encountered.  Next, the foot was packed with iodoform and 3-0 nylon retention 
sutures were placed.  The foot was dressed with 4 x 4's, Kerlix and Ace wrap.  
Patient noted to tolerate both procedure and anesthesia well and the patient was
transported from the operating room to recovery with vital signs stable.

## 2018-08-15 VITALS — SYSTOLIC BLOOD PRESSURE: 130 MMHG | DIASTOLIC BLOOD PRESSURE: 80 MMHG

## 2018-08-15 VITALS — SYSTOLIC BLOOD PRESSURE: 126 MMHG | DIASTOLIC BLOOD PRESSURE: 78 MMHG

## 2018-08-15 VITALS — SYSTOLIC BLOOD PRESSURE: 122 MMHG | DIASTOLIC BLOOD PRESSURE: 68 MMHG

## 2018-08-15 LAB
ABSOLUTE GRANULOCYTE CT: 5.6 /CUMM (ref 1.4–6.5)
BASOPHILS # BLD: 0 /CUMM (ref 0–0.2)
BASOPHILS NFR BLD: 0.3 % (ref 0–2)
EOSINOPHIL # BLD: 0.2 /CUMM (ref 0–0.7)
EOSINOPHIL NFR BLD: 2.5 % (ref 0–5)
ERYTHROCYTE [DISTWIDTH] IN BLOOD BY AUTOMATED COUNT: 12.3 % (ref 11.5–14.5)
GRANULOCYTES NFR BLD: 71.9 % (ref 42.2–75.2)
HCT VFR BLD CALC: 36.7 % (ref 42–52)
LYMPHOCYTES # BLD: 1.4 /CUMM (ref 1.2–3.4)
MCH RBC QN AUTO: 30 PG (ref 27–31)
MCHC RBC AUTO-ENTMCNC: 33.2 G/DL (ref 33–37)
MCV RBC AUTO: 90.3 FL (ref 80–94)
MONOCYTES # BLD: 0.6 /CUMM (ref 0.1–0.6)
PLATELET # BLD: 338 /CUMM (ref 130–400)
PMV BLD AUTO: 7.9 FL (ref 7.4–10.4)
RED BLOOD CELL CT: 4.06 /CUMM (ref 4.7–6.1)
WBC # BLD AUTO: 7.8 /CUMM (ref 4.8–10.8)

## 2018-08-15 NOTE — CONS- INFECT DISEASE
General Information and HPI
 
Consulting Request
Date of Consult: 08/15/18
Requested By:
Angelo Bernardo MD
 
Reason for Consult:
Osteomyelitis of the right fourth toe
Source of Information: patient
History of Present Illness:
This is a 57-year-old man with a history of diabetes, complicated by a 
neuropathy, status post occipital CVA and TIA, bladder cancer, status post TURBT
, BPH and schizophrenia admitted on August 12 with a one week history of pain in
the right foot and the more acute onset of discoloration of the right fourth toe
, with no fevers or chills.  On admission he was afebrile.  Laboratory data 
revealed a white blood cell count of 7000, glucose 327, BUN/creatinine 24 and 
1.2, with normal liver enzymes, hemoglobin A1c 12.  Urinalysis negative.  X-ray 
of the right foot revealed a large lytic lesion with significant cortical and 
medullary bone erosion and resorption in the middle phalanx of the fourth digit 
and at the medial base of the distal phalanx of the fourth digit.  Chest x-ray 
was negative.  He was given Vancomycin and Ceftazidime in the emergency room and
was then followed off antibiotics.  On August 13 1 blood culture was reported 
positive for gram-positive rods, which was identified as Bacillus species.  On 
August 14 he was taken to the OR for an I&D of the right foot with an open 
partial fourth ray resection, following which he was begun on Unasyn.  He has 
been afebrile since admission.  At present he does report pain in the right 
foot.
 
Allergies/Medications
Allergies:
Coded Allergies:
No Known Drug Allergies (Intermediate, NONE 06/06/17)
 
Home Med List:
Acetaminophen (Tylenol Extra Strength) 500 MG TABLET   1-2 TAB PO TID PRN PAIN 
Aspirin (Aspirin*) 81 MG TAB.CHEW   81 MG PO DAILY HEART HEALTH
Atorvastatin Calcium 40 MG TABLET   1 TAB PO DAILY CHOLESTEROL  (Reported)
Bupropion HCl (Bupropion XL) 300 MG TAB.ER.24H   1 TAB PO DAILY MENTAL HEALTH  (
Reported)
Clonazepam 1 MG TABLET   1 TAB PO TID ANXIETY/SLEEP  (Reported)
Folic Acid 1 MG TABLET   1 TAB PO DAILY SUPPLEMENT  (Reported)
Gabapentin 300 MG CAPSULE   1 CAP PO TID MENTAL HEALTH  (Reported)
Ibuprofen 600 MG TABLET   1 TAB PO TID PRN PAIN
     with food
Insulin Glargine,Hum.rec.anlog (Toujeo Solostar) (Unknown Strength) INSULN.PEN  
40 UNITS SC DAILY DM  (Reported)
Insulin Lispro (Humalog Kwikpen U-100) 100 UNIT/ML INSULN.PEN   8 UNITS SC TIDAC
DM  (Reported)
Lisinopril 2.5 MG TABLET   1 TAB PO DAILY BP  (Reported)
Metoclopramide HCl (Reglan) 10 MG TABLET   1 TAB PO 4 TIMES/DAY PRN HEADACHE/
NAUSEA 
     30 minutes before meals and bedtime
Risperidone 4 MG TABLET   1 TAB PO BID MENTAL HEALTH  (Reported)
Sitagliptin Phos/Metformin HCl (Janumet 50-1,000 MG Tablet) 50 MG-1,000 MG 
TABLET   1 TAB PO BID DM  (Reported)
 
 
Past History
 
Travel History
Traveled to [place] past 21 day No
 
Medical History
Blood Transfusion Hx: No
Neurological: CVA (occipital), peripheral neuropathy, TIA
EENT: NONE
Cardiovascular: NONE
Respiratory: Pleural effusion
Gastrointestinal: NONE
Hepatic: NONE
Renal: benign prost hyperplasia, bladder cancer 
Musculoskeletal: NONE
Psychiatric: schizophrenia
Endocrine: diabetes
Blood Disorders: NONE
Cancer(s): bladder cancer
GYN/Reproductive: NONE
Other Medical Hx:
Schizophrenia diabetes neuropathy pleural effusion
History of MRSA: No
History of VRE: No
History of CDIFF: No
Isolation History: Standard
 
Surgical History
Surgical History: s/p TURBT
 
Psychosocial History
Where Do You Live? Home
Who Do You Live With? self
Primary Language: English
Smoking Status: Current Everyday Smoker
ETOH Use: occasional use
Illicit Drug Use: denies illicit drug use
Living Will? unknown
Power of /HCP? unknown
Name of POA/HCP: unknown
 
Functional Ability
ADLs
Independent: dressing, eating, toileting, bathing. 
Ambulation: walker
IADLs
Independent: telephone. 
 
Review of Systems
 
Review of Systems
All Other Systems: Reviewed and Negative
 
Exam & Diagnostic Data
Last 24 Hrs of Vital Signs/I&O
 Vital Signs
 
 
Date Time Temp Pulse Resp B/P B/P Pulse O2 O2 Flow FiO2
 
     Mean Ox Delivery Rate 
 
08/15 1507 98.4 86 18 126/78  98 Room Air  
 
08/15 0749 98.5 97 18 122/68     
 
08/15 0631 98.5 97 18 122/68  98 Room Air  
 
08/14 2205 98.2 89 19 114/80  96 Room Air  
 
 
 Intake & Output
 
 
 08/15 1600 08/15 0800 08/15 0000
 
Intake Total 900 500 300
 
Output Total 750 1050 1780
 
Balance 150 -550 -1480
 
    
 
Intake,  300 
 
Intake, Oral 800 200 300
 
Output, Urine 750 1050 1780
 
 
 
 
Physical Exam
Other Physical Findings:
He is awake and alert in no acute distress.  He is afebrile.  Skin reveals no 
rash.  HEENT exam is negative.  Neck is supple with no adenopathy.  Lungs are 
clear.  Heart regular rhythm with no murmur.  Abdomen is soft, nontender with 
positive bowel sounds.  Back no CVA tenderness.  Extremities right foot dressing
intact, with no cyanosis, clubbing or edema.  Neuro neuropathy both feet.
 
Last 24 Hours of Lab Results:
 Laboratory Tests
 
 
 08/15
 
 0640
 
Chemistry 
 
  Sodium (137 - 145 mmol/L) 136  L
 
  Potassium (3.5 - 5.1 mmol/L) 4.6
 
  Chloride (98 - 107 mmol/L) 100
 
  Carbon Dioxide (22 - 30 mmol/L) 29
 
  Anion Gap (5 - 16) 7
 
  BUN (9 - 20 mg/dL) 11
 
  Creatinine (0.7 - 1.2 mg/dL) 0.8
 
  Estimated GFR (>60 ml/min) > 60
 
  BUN/Creatinine Ratio (7 - 25 %) 13.8
 
Hematology 
 
  CBC w Diff NO MAN DIFF REQ
 
  WBC (4.8 - 10.8 /CUMM) 7.8
 
  RBC (4.70 - 6.10 /CUMM) 4.06  L
 
  Hgb (14.0 - 18.0 G/DL) 12.2  L
 
  Hct (42 - 52 %) 36.7  L
 
  MCV (80.0 - 94.0 FL) 90.3
 
  MCH (27.0 - 31.0 PG) 30.0
 
  MCHC (33.0 - 37.0 G/DL) 33.2
 
  RDW (11.5 - 14.5 %) 12.3
 
  Plt Count (130 - 400 /CUMM) 338
 
  MPV (7.4 - 10.4 FL) 7.9
 
  Gran % (42.2 - 75.2 %) 71.9
 
  Lymphocytes % (20.5 - 51.1 %) 17.9  L
 
  Monocytes % (1.7 - 9.3 %) 7.4
 
  Eosinophils % (0 - 5 %) 2.5
 
  Basophils % (0.0 - 2.0 %) 0.3
 
  Absolute Granulocytes (1.4 - 6.5 /CUMM) 5.6
 
  Absolute Lymphocytes (1.2 - 3.4 /CUMM) 1.4
 
  Absolute Monocytes (0.10 - 0.60 /CUMM) 0.6
 
  Absolute Eosinophils (0.0 - 0.7 /CUMM) 0.2
 
  Absolute Basophils (0.0 - 0.2 /CUMM) 0
 
 
 
Last 24 Hours of Fer Results:
Blood cultures August 12 1 bottle positive for Bacillus species
 
OR culture labeled right foot August 14 positive for alpha strep, Staph aureus 
and gram-negative rods
 
 
Diagnostic Data
Recent Imaging Findings:
X-ray of the right foot August 12 revealed a large lytic lesion with significant
cortical and medullary bone erosion and resorption in the middle phalanx of the 
fourth digit and at the medial base of the distal phalanx of the fourth digit.  
 
Chest x-ray August 12 negative
 
MRI of the right foot August 13 revealed osteomyelitis involving the distal and 
middle phalanges, as well as the head of the proximal phalanx, of the fourth toe
 
 
Assessment/Plan
 
Assessment/Plan
Impression:
This is a 57-year-old man with a history of diabetes, complicated by a 
neuropathy, admitted on August 12 with a one week history of pain in the right 
foot and the more acute onset of discoloration of the right fourth toe, found to
be afebrile with a normal white blood cell count and with an x-ray and MRI of 
the right foot revealing osteomyelitis of the distal and middle phalanges, as 
well as the head of the proximal phalanx, of the fourth toe, now one day status 
post open partial fourth ray resection of the right foot.
 
His clinical picture is consistent with gangrene and osteomyelitis of the right 
fourth toe.  He has undergone resection of the fourth ray and, with the MRI not 
suggesting any involvement of the metatarsal, it is possible that all of the 
infected bone has been removed.  This will need to be verified with Podiatry.  
He will likely be returning to the OR later this week for wound closure.  In the
interim he can be continued on empiric antibiotics with Unasyn pending final OR 
cultures.  The positive blood culture for Bacillus likely represents a 
contaminant and does not require treatment.
 
Suggestion:
1.  Follow-up final OR culture
2.  Await probable return to the OR later this week
3.  Consider arterial Doppler of the right lower extremity
4.  Continue Unasyn pending above
 
 
 
Consult Acknowledgment
- Thank you for your consult request.

## 2018-08-15 NOTE — PATIENT DISCHARGE INSTRUCTIONS
Discharge Instructions
 
General Discharge Information
You were seen/treated for:
Osteomyelitis, Right Foot
You had these procedures:
I&D Right Foot 
Watch for these problems:
If you experience any worsening pain in the right foot or develop discharge, 
redness, and/or swelling in the right foot please follow up with your PCP.
Special Instructions:
Please follow up with your PCP in 7 days.
 
Please follow up with an Endocrinologist to address your diabetic management 
within 2 weeks.
 
Continue IV Unasyn via PICC line to complete 4 week course from last debridement
(until Sept. 14)
 
Weekly ESR lab test while on Unasyn, last ESR 84 (08/18), next scheduled ESR are
08/25, 09/01, & 
09/08.
 
Suggestion for supportive psychotherapy at short-term rehab, as needed per 
patient's needs.
 
Please continue to take all home medications.
 
Diet
Continue normal diet: No
Recommended Diet: Diabetic
 
Activity
Full Activity/No Limits: No
Activity Self Limited: Yes
 
Acute Coronary Syndrome
 
Inclusion Criteria
At DC or during hospital stay patient has or had the following:
ACS DIAGNOSIS No
 
Discharge Core Measures
Meds if any: Prescribed or Continued at Discharge
Meds if any: NOT Prescribed or Continued at Discharge
 
Congestive Heart Failure
 
Inclusion Criteria
At DC or during hospital stay patient has or had the following:
CHF DIAGNOSIS No
 
Discharge Core Measures
Meds if any: Prescribed or Continued at Discharge
Meds if any: NOT Prescribed or Continued at Discharge
 
Cerebrovascular accident
 
Inclusion Criteria
At DC or during hospital stay patient has or had the following:
CVA/TIA Diagnosis No
 
Discharge Core Measures
Meds if any: Prescribed or Continued at Discharge
Meds if any: NOT Prescribed or Continued at Discharge
 
Venous thromboembolism
 
Inclusion Criteria
VTE Diagnosis No
VTE Type NONE
VTE Confirmed by (Test) NONE
 
Discharge Core Measures
- Per Current guidelines, there needs to be overlap
- treatment for the first 5 days of Warfarin therapy.
- If discharged on Warfarin prior to 5 days of
- overlap therapy, the patient will need to be
- assessed for post discharge needs including
- *Post discharge parental anticoagulation
- *Warfarin and/or parental anticoagulation education
- *Follow up date to check INR post discharge
At least 5 days overlap therapy as Inpatient No
Meds if any: Prescribed or Continued at Discharge
Note: Overlap Therapy is Warfarin and Anticoagulant
Meds if any: NOT Prescribed or Continued at Discharge

## 2018-08-15 NOTE — PN- HOUSESTAFF
**See Addendum**
Subjective
Follow-up For:
Osteomyelitis
Subjective:
Afebrile overnight. No acute events overnight. Patient states he is doing 
generally well after his podiatry operation yesterday, apart from the on and off
pain in his right foot that was present prior to surgery. Patient rates his pain
as 4 out of 10 at the moment in the right foot. Patient states he also gets 
peripheral neuropathy but otherwise denies any n/v, diarrhea, constipation, 
palpitations, and shortness of breath. Patient understands the need for tight 
glucose control on a daily basis to prevent further complications of his 
diabetes mellitus.
 
Review of Systems
Constitutional:
Reports: see HPI. 
 
Objective
Last 24 Hrs of Vital Signs/I&O
 Vital Signs
 
 
Date Time Temp Pulse Resp B/P B/P Pulse O2 O2 Flow FiO2
 
     Mean Ox Delivery Rate 
 
08/15 0631 98.5 97 18 122/68  98 Room Air  
 
 2205 98.2 89 19 114/80  96 Room Air  
 
 1439 98.3 62 18 124/62  98 Room Air  
 
 1256 97.8 74 20 118/68  98 Room Air  
 
 
 Intake & Output
 
 
 08/15 0800 08/15 0000  1600
 
Intake Total 500 300 800
 
Output Total 1050 1780 775
 
Balance -550 -1480 25
 
    
 
Intake,   800
 
Intake, Oral 200 300 
 
Output, Urine 1050 1780 775
 
 
 
 
Physical Exam
General Appearance: Alert, Oriented X3, Cooperative, No Acute Distress
Skin: right foot dressing s/p open I&D
HEENT: Atraumatic
Neck: Supple, No JVD
Cardiovascular: Regular Rate, Normal S1, Normal S2
Lungs: Clear to Auscultation
Abdomen: Soft, No Tenderness
Neurological: Normal Speech
Extremities: No Edema
 
Assessment/Plan
Assessment:
MRI-RT FOOT W/O & W JOSE A - 
- Findings consistent with osteomyelitis involving the distal and middle
phalanges, as well as head of the proximal phalanx, of the fourth toe.
- Soft tissue edema/cellulitis without abscess.
- Hallux valgus and moderate osteoarthritis of great toe metatarsophalangeal
joint.
 
56 YO M with PMH of HTN, BPH, DM, bladder mass who comes in for CC of RLE pain. 
RLE xray in ED showed "Rarefaction of the bones of the middle and distal phalanx
of the fourth digit is seen with large lytic lesion and cortical erosion along 
the medial aspect of the middle phalanx and
medial base of the distal phalanx, suspicious for osteomyelitis" with likely 
exension into the joint. Pt is afebrile without leukocytosis and was given one 
dose of ceftaz/vanco in ED. 
 
Osteomyelitis and dry gangrene of the 4th digit in Right Lower Extremity: 
* Podiatry consulted; s/p Open I&D surgical intervention 
* Surgical procedure with podiatry: Open Incision and Drainage of the right foot
* Pt. open to Rehab placement after surgical operation
* Blood Cx.: Gram Negative Robel, Bacillus species, started on Unasyn 3G IV q6h
* pain mgmt
* Gross specimen grew GP cocci, scant GNR; continue IV Unasyn and await return 
to OR for podiatry follow up later this week
 
Diabetes Mellitus:
* RISS
* FS
* Patient needs diabetic education
* HBA1C
* Continue low dose Lisinopril
* Hold home regimen
 
Schizophrenia:
* Obtain psych med records
* Holdling off his all meds at this time
 
FC
Problem List:
 1. Osteomyelitis
 
Pain Ratin
Pain Location:
right foot
Pain Goal: Pain 4 or less
Pain Plan:
prn pain meds
Tomorrow's Labs & Rationales:
routine

## 2018-08-15 NOTE — DISCHARGE SUMMARY
Visit Information
 
Visit Dates
Admission Date:
08/12/18
 
Discharge Date:
08/23/18
 
 
Hospital Course
 
Course
Attending Physician:
Angelo Bernardo MD
 
Primary Care Physician:
Karo Pierre
 
Consulting Request:
   Consulting Specialty: Infectious Disease
   Consulting Physician:
Dr. Crockett
   Reason for Consult: Infection, Osteomyelitis
Hospital Course:
56 YO M with PMH of HTN, BPH, DM, bladder mass who comes in for CC of RLE pain. 
RLE xray in ED showed "Rarefaction of the bones of the middle and distal phalanx
of the fourth digit is seen with large lytic lesion and cortical erosion along 
the medial aspect of the middle phalanx and
medial base of the distal phalanx, suspicious for osteomyelitis" with likely 
exension into the joint. Pt is afebrile without leukocytosis and was given one 
dose of ceftaz/vanco in ED. 
 
1. Osteomyelitis and dry gangrene of the 4th digit in Right Lower Extremity: 
* Podiatry consulted; s/p Open I&D surgical intervention 8/14, revision 
performed on 08/17
* Surgical procedure with podiatry: Open Incision and Drainage of the right foot
* Blood Cx.: Gram Negative Robel, Bacillus species, started on Unasyn 3G IV q6h
* Gross specimen grew GP cocci, scant GNR; continue IV Unasyn and await return 
to OR for podiatry follow up later this week
* US Duplex Arterial; no visualized arterial stenosis 
* Continue Unasyn to complete 4 week course from last debridement (until Sept. 14)
* Weekly ESR while on Unasyn, last ESR 84 (08/18), next 08/25, 09/01, and 09/08.
 
2. Diabetes Mellitus:
* RISS
* Patient needs diabetic education, counseled patient on needs to restrict sugar
intact and having a balanced diet
* HBA1C
* Continued low dose Lisinopril
* Held home regimen
 
3. Schizophrenia:
* Pt. stable upon admission, held his psych medications initially prior to 
confirming with [facility] regarding 
patient's home medications; thereupon, we restarted patient's Risperidone upon 
completing an EKG (to rule out any QT Prolongation)
* Pt. is tolerating medications and has no active issues
* Suggestion for supportive psychotherapy at short term rehab due to risk of an 
adjustment disorder per psychiaty recommendations
Allergies:
Coded Allergies:
No Known Drug Allergies (Intermediate, NONE 06/06/17)
 
Significant Procedures:
US-DUPLEX SCAN LOWER EXT ARTER - 
1. Normal velocities throughout the right lower extremity without evidence of
velocity shift. Therefore, no hemodynamically significant stenosis was
visualized.
 
Foot MRI - 
- Findings consistent with osteomyelitis involving the distal and middle
phalanges, as well as head of the proximal phalanx, of the fourth toe.
- Soft tissue edema/cellulitis without abscess.
- Hallux valgus and moderate osteoarthritis of great toe metatarsophalangeal
joint.
 
Disposition Summary
 
Disposition
Principal Diagnosis:
Osteomyelitis
Additional Diagnosis:
Diabetes Mellitus
Discharge Disposition: SNF
 
Discharge Instructions
 
General Discharge Information
Code Status: Full Code
Patient's Diet:
Diabetic Diet
Patient's Activity:
Ad santa
Follow-Up Instructions/Appts:
Please follow up with your PCP within 7 days.
 
Please continue to take your home medications.
 
Medications at Discharge
Discharge Medications:
Stop taking the following medications:
Clonazepam (Clonazepam) 1 MG TABLET ORAL THREE TIMES DAILY Qty = 30
 
Gabapentin (Gabapentin) 300 MG CAPSULE ORAL THREE TIMES DAILY Qty = 60
 
Continue taking these medications:
Insulin Lispro (Humalog Kwikpen U-100) 100 UNIT/ML INSULN.PEN
    8 Units SC 3 TIMES DAILY BEFORE MEALS
    Qty = 15
    Comments:
       NOT GIVEN AT HOSPITAL
 
Folic Acid (Folic Acid) 1 MG TABLET
    1 Tablet ORAL DAILY
    Qty = 30
    Comments:
       Last Taken: 8/23/18
             Time:  9 AM
 
Risperidone (Risperidone) 4 MG TABLET
    1 Tablet ORAL TWICE DAILY
    Qty = 60
    Comments:
       Last Taken: 8/23/18
             Time: 9 AM
 
Bupropion HCl (Bupropion XL) 300 MG TAB.ER.24H
    1 Tablet ORAL DAILY
    Qty = 30
    Comments:
       Last Taken: 8/23/18
             Time: 9 AM
 
Insulin Glargine,Hum.rec.anlog (Toujeo Solostar) (Unknown Strength) INSULN.PEN
    40 Units SC DAILY
    Qty = 9
    Comments:
       Last Taken: NOT GIVEN AT HOSPITAL
             Time:
 
Lisinopril (Lisinopril) 2.5 MG TABLET
    1 Tablet ORAL DAILY
    Qty = 90
    Comments:
       Last Taken: 8/23/18
             Time:8AM
 
Atorvastatin Calcium (Atorvastatin Calcium) 40 MG TABLET
    1 Tablet ORAL DAILY
    Qty = 90
    Comments:
       Last Taken: 8/23/18
             Time: 8AM
 
Sitagliptin Phos/Metformin HCl (Janumet 50-1,000 MG Tablet) 50 MG-1,000 MG 
TABLET
    1 Tablet ORAL TWICE DAILY
    Qty = 180
    Comments:
       NOT GIVEN AT HOSPITAL
 
Aspirin (Aspirin*) 81 MG TAB.CHEW
    81 Milligram ORAL DAILY
    Qty = 60
    Comments:
       NOT GIVEN
           
 
Metoclopramide HCl (Reglan) 10 MG TABLET
    1 Tablet ORAL 4 TIMES A DAY as needed for HEADACHE/NAUSEA 
    Qty = 30
    Instructions:
       30 minutes before meals and bedtime
 
Acetaminophen (Tylenol Extra Strength) 500 MG TABLET
    1-2 Tablet ORAL THREE TIMES DAILY as needed for PAIN 
    Qty = 30
 
Ibuprofen (Ibuprofen) 600 MG TABLET
    1 Tablet ORAL THREE TIMES DAILY as needed for PAIN
    Qty = 30
    Instructions:
       with food
 
Start taking the following new medications:
Trazodone HCl (Trazodone HCl) 50 MG TABLET
    1 Tablet ORAL Every night as needed for INSOMNIA
    Qty = 30
    No Refills
    Comments:
       Please take at night as needed for sleep.
       NOT GIVEN IN HOSPITAL
 
Ampicillin Sodium/Sulbactam Na (Unasyn 3 Gm Vial) 3 GRAM VIAL
    1 Inj IV EVERY SIX HOURS
    Qty = 90
    No Refills
    Instructions:
       IV Unasyn to be administered every 6 hours.
    Comments:
       IV Unasyn to be administered every 6 hours.
 
 
Copies To:
Karo Pierre
 
Attending MD Review Statement
Documenting Attending:
Angelo Bernardo MD
Other Findings:
The patient was seen and agree with the summary of care and plan of care. Will 
need weekly ESR measurement during IV Unasyn therapy. Dr. Crockett is ID 
consultant. OK to discharge to [facility] in [facility].

## 2018-08-16 VITALS — SYSTOLIC BLOOD PRESSURE: 112 MMHG | DIASTOLIC BLOOD PRESSURE: 80 MMHG

## 2018-08-16 VITALS — SYSTOLIC BLOOD PRESSURE: 120 MMHG | DIASTOLIC BLOOD PRESSURE: 82 MMHG

## 2018-08-16 VITALS — SYSTOLIC BLOOD PRESSURE: 112 MMHG | DIASTOLIC BLOOD PRESSURE: 58 MMHG

## 2018-08-16 LAB
ABSOLUTE GRANULOCYTE CT: 5.3 /CUMM (ref 1.4–6.5)
BASOPHILS # BLD: 0 /CUMM (ref 0–0.2)
BASOPHILS NFR BLD: 0.4 % (ref 0–2)
EOSINOPHIL # BLD: 0.2 /CUMM (ref 0–0.7)
EOSINOPHIL NFR BLD: 3.2 % (ref 0–5)
ERYTHROCYTE [DISTWIDTH] IN BLOOD BY AUTOMATED COUNT: 12.4 % (ref 11.5–14.5)
GRANULOCYTES NFR BLD: 68.9 % (ref 42.2–75.2)
HCT VFR BLD CALC: 36.6 % (ref 42–52)
LYMPHOCYTES # BLD: 1.4 /CUMM (ref 1.2–3.4)
MCH RBC QN AUTO: 30.1 PG (ref 27–31)
MCHC RBC AUTO-ENTMCNC: 33.7 G/DL (ref 33–37)
MCV RBC AUTO: 89.4 FL (ref 80–94)
MONOCYTES # BLD: 0.7 /CUMM (ref 0.1–0.6)
PLATELET # BLD: 367 /CUMM (ref 130–400)
PMV BLD AUTO: 7.4 FL (ref 7.4–10.4)
RED BLOOD CELL CT: 4.09 /CUMM (ref 4.7–6.1)
WBC # BLD AUTO: 7.6 /CUMM (ref 4.8–10.8)

## 2018-08-16 NOTE — PN- HOUSESTAFF
**See Addendum**
Subjective
Follow-up For:
Osteomyelitis
Subjective:
Afebrile overnight. No acute events overnight. Patient is awake and sitting in 
bed comfortably. Patient denies any foot pain this morning. Patient states he 
does have the chronic peripheral neuropathy but otherwise denies any chest pain,
shortness of breath, fevers, chills, and fatigue. Patient states he has been 
eating well and having normal bowel movements. Patient spoke with Dr. Currie 
but unsure when he might be going back for a follow up procedure.
 
Review of Systems
Constitutional:
Reports: see HPI. 
 
Objective
Last 24 Hrs of Vital Signs/I&O
 Vital Signs
 
 
Date Time Temp Pulse Resp B/P B/P Pulse O2 O2 Flow FiO2
 
     Mean Ox Delivery Rate 
 
 0556 98.0 74 20 120/82  97   
 
08/15 2341 98.4 88 18 130/80  97 Room Air  
 
08/15 1507 98.4 86 18 126/78  98 Room Air  
 
08/15 0749 98.5 97 18 122/68     
 
 
 Intake & Output
 
 
  0800  0000 08/15 1600
 
Intake Total 500 240 900
 
Output Total 500 400 750
 
Balance 0 -160 150
 
    
 
Intake,   100
 
Intake, Oral 400 240 800
 
Output, Urine 500 400 750
 
 
 
 
Physical Exam
General Appearance: Alert, Oriented X3, Cooperative, No Acute Distress
Skin: right foot s/p I&D 
HEENT: Atraumatic
Neck: Supple, No JVD
Cardiovascular: Regular Rate, Normal S1, Normal S2
Lungs: Clear to Auscultation
Abdomen: Soft, No Tenderness
Neurological: Normal Speech
Extremities: No Edema
 
Assessment/Plan
Assessment:
US-DUPLEX SCAN LOWER EXT ARTER - 
1. Normal velocities throughout the right lower extremity without evidence of
velocity shift. Therefore, no hemodynamically significant stenosis was
visualized.
 
MRI-RT FOOT W/O & W JOSE A - 
- Findings consistent with osteomyelitis involving the distal and middle
phalanges, as well as head of the proximal phalanx, of the fourth toe.
- Soft tissue edema/cellulitis without abscess.
- Hallux valgus and moderate osteoarthritis of great toe metatarsophalangeal
joint.
 
58 YO M with PMH of HTN, BPH, DM, bladder mass who comes in for CC of RLE pain. 
RLE xray in ED showed "Rarefaction of the bones of the middle and distal phalanx
of the fourth digit is seen with large lytic lesion and cortical erosion along 
the medial aspect of the middle phalanx and
medial base of the distal phalanx, suspicious for osteomyelitis" with likely 
exension into the joint. Pt is afebrile without leukocytosis and was given one 
dose of ceftaz/vanco in ED. 
 
Osteomyelitis and dry gangrene of the 4th digit in Right Lower Extremity: 
* Podiatry consulted; s/p Open I&D surgical intervention 
* Surgical procedure with podiatry: Open Incision and Drainage of the right foot
* Pt. open to Rehab placement after surgical operation
* Blood Cx.: Gram Negative Robel, Bacillus species, started on Unasyn 3G IV q6h
* pain mgmt
* Gross specimen grew GP cocci, scant GNR; continue IV Unasyn and await return 
to OR for podiatry follow up later this week; polymicrobial osteomyelitis with 
concern for fourth metatarsal involvement, will likely require long term 
anbitiotic therapy
* PICC placement, pending
* US Duplex Arterial; no visualized arterial stenosis 
 
Diabetes Mellitus:
* RISS
* FS
* Patient needs diabetic education
* HBA1C
* Continue low dose Lisinopril
* Hold home regimen
 
Schizophrenia:
* Obtain psych med records
* Holdling off his all meds at this time
 
FC
Problem List:
 1. Osteomyelitis
 
Pain Ratin
Pain Location:
na
Pain Goal: Remain pain free
Pain Plan:
prn pain meds
Tomorrow's Labs & Rationales:
routine
Consulting Request:
   Consulting Specialty: Infectious Disease
   Consulting Physician:
Dr. Crockett
   Reason for Consult: Infection, Osteomyelitis

## 2018-08-16 NOTE — PN- INFECT DX
Subjective
Subjective:
Afebrile.  He notes some discomfort in the right foot
 
Objective
Last 24 Hrs of Vital Signs/I&O
 Vital Signs
 
 
Date Time Temp Pulse Resp B/P B/P Pulse O2 O2 Flow FiO2
 
     Mean Ox Delivery Rate 
 
08/16 0556 98.0 74 20 120/82  97   
 
08/15 2341 98.4 88 18 130/80  97 Room Air  
 
08/15 1507 98.4 86 18 126/78  98 Room Air  
 
 
 Intake & Output
 
 
 08/16 1600 08/16 0800 08/16 0000
 
Intake Total  500 240
 
Output Total  500 400
 
Balance  0 -160
 
    
 
Intake, IV  100 
 
Intake, Oral  400 240
 
Output, Urine  500 400
 
 
 
 
Physical Exam
Other Physical Findings:
He appears comfortable in no acute distress
Extremities right foot dressing intact
 
 
Results
Last 24 Hours of Lab Results:
 Laboratory Tests
 
 
 08/16
 
 0650
 
Hematology 
 
  CBC w Diff NO MAN DIFF REQ
 
  WBC (4.8 - 10.8 /CUMM) 7.6
 
  RBC (4.70 - 6.10 /CUMM) 4.09  L
 
  Hgb (14.0 - 18.0 G/DL) 12.3  L
 
  Hct (42 - 52 %) 36.6  L
 
  MCV (80.0 - 94.0 FL) 89.4
 
  MCH (27.0 - 31.0 PG) 30.1
 
  MCHC (33.0 - 37.0 G/DL) 33.7
 
  RDW (11.5 - 14.5 %) 12.4
 
  Plt Count (130 - 400 /CUMM) 367
 
  MPV (7.4 - 10.4 FL) 7.4
 
  Gran % (42.2 - 75.2 %) 68.9
 
  Lymphocytes % (20.5 - 51.1 %) 18.9  L
 
  Monocytes % (1.7 - 9.3 %) 8.6
 
  Eosinophils % (0 - 5 %) 3.2
 
  Basophils % (0.0 - 2.0 %) 0.4
 
  Absolute Granulocytes (1.4 - 6.5 /CUMM) 5.3
 
  Absolute Lymphocytes (1.2 - 3.4 /CUMM) 1.4
 
  Absolute Monocytes (0.10 - 0.60 /CUMM) 0.7  H
 
  Absolute Eosinophils (0.0 - 0.7 /CUMM) 0.2
 
  Absolute Basophils (0.0 - 0.2 /CUMM) 0
 
 
 
Last 24 Hours of Fer Results:
OR culture August 14 labeled right foot bone positive for Klebsiella pneumoniae 
resistant to Ampicillin, alpha strep, Staph aureus sensitive to Oxacillin and 
diphtheroids
 
Recent Imaging Studies:
Arterial Doppler of the right lower extremity August 15 reveals normal 
velocities throughout the right lower extremity
 
 
Assessment/Plan ID
Impression:
Stable, with his temperatures and white blood cell count remaining normal, on 
Unasyn for a polymicrobial osteomyelitis, status post I&D of the right foot with
an open, partial fourth ray resection 2 days ago, with plans for a return to the
OR in the a.m. for further debridement.  I have discussed with Podiatry, who 
feels that, based on his concern of fourth metatarsal involvement, he will 
require a prolonged course of antibiotics for residual osteomyelitis.
 
Suggestion:
1.  Follow-up final OR culture
2.  Await return to the OR in the a.m. for further debridement
3.  Would proceed with placement of a PICC
4.  Continue Unasyn pending above

## 2018-08-16 NOTE — ULTRASOUND REPORT
EXAMINATION:
COLOR-FLOW DUPLEX IMAGING OF THE BILATERAL LOWER EXTREMITY ARTERIAL SYSTEM.
VELOCITY MEASUREMENTS THROUGHOUT THE FEMORAL ARTERIES WITH PERIPHERAL
ARTERIAL TESTING.
 
CLINICAL INFORMATION:
There is a 57-year-old male with history of diabetes, claudication, thickened
toenails. Right lower extremity osteomyelitis. Possible peripheral arterial
disease.
 
TECHNIQUE: Color-flow duplex imaging with velocity measurement was obtained
through the right lower extremity. The dorsalis pedis could not be visualized
due to the bandaging on the right foot and a toe application surgery.
 
Interventional Radiologist:   Aayush Rodarte M.D., F.S.I.R., F.A.C.R.
 
 
 
 
RIGHT FEMORAL RUNOFF VELOCITIES:
 
The right common femoral artery measures 138 cm/s
 
The right profunda femoral artery is 72 cm/s
 
Right proximal superficial femoral artery measures 136 cm/s
 
Mid superficial femoral artery is 106 cm/s
 
Distal right superficial femoral artery measures 90 cm/s
 
Right popliteal velocity measures 106 cm/s
 
The posterior tibial artery velocity measures 153 cm/s
 
The anterior tibial artery velocity measures 80 cm/s
 
The dorsalis pedis was not visualized.
 
 
 
 
 
 
IMPRESSION:
 
1. Normal velocities throughout the right lower extremity without evidence of
velocity shift. Therefore, no hemodynamically significant stenosis was
visualized.

## 2018-08-17 VITALS — SYSTOLIC BLOOD PRESSURE: 136 MMHG | DIASTOLIC BLOOD PRESSURE: 70 MMHG

## 2018-08-17 VITALS — SYSTOLIC BLOOD PRESSURE: 120 MMHG | DIASTOLIC BLOOD PRESSURE: 70 MMHG

## 2018-08-17 VITALS — DIASTOLIC BLOOD PRESSURE: 70 MMHG | SYSTOLIC BLOOD PRESSURE: 108 MMHG

## 2018-08-17 PROCEDURE — 02HV33Z INSERTION OF INFUSION DEVICE INTO SUPERIOR VENA CAVA, PERCUTANEOUS APPROACH: ICD-10-PCS

## 2018-08-17 PROCEDURE — 0JXQ0ZB TRANSFER RIGHT FOOT SUBCUTANEOUS TISSUE AND FASCIA WITH SKIN AND SUBCUTANEOUS TISSUE, OPEN APPROACH: ICD-10-PCS | Performed by: PODIATRIST

## 2018-08-17 PROCEDURE — 0Y6M0ZD DETACHMENT AT RIGHT FOOT, PARTIAL 4TH RAY, OPEN APPROACH: ICD-10-PCS | Performed by: PODIATRIST

## 2018-08-17 PROCEDURE — 0JBQ0ZZ EXCISION OF RIGHT FOOT SUBCUTANEOUS TISSUE AND FASCIA, OPEN APPROACH: ICD-10-PCS | Performed by: PODIATRIST

## 2018-08-17 PROCEDURE — 3E0T3BZ INTRODUCTION OF ANESTHETIC AGENT INTO PERIPHERAL NERVES AND PLEXI, PERCUTANEOUS APPROACH: ICD-10-PCS | Performed by: PODIATRIST

## 2018-08-17 PROCEDURE — B5181ZA FLUOROSCOPY OF SUPERIOR VENA CAVA USING LOW OSMOLAR CONTRAST, GUIDANCE: ICD-10-PCS

## 2018-08-17 NOTE — PN- HOUSESTAFF
**See Addendum**
Subjective
Follow-up For:
Osteomyelitis
Subjective:
Afebrile overnight. Patient sleeping comfortably in bed. Patient aware he will 
going to OR again today for podiatry revision for his right foot osteomyelitis. 
Patient today denies any pain in the legs and has been eating fine. Patient also
scheduled for PICC line placement which has been completed prior to scheduled OR
procedure. Patient otherwise denies any other complaints today.
 
Review of Systems
Constitutional:
Reports: see HPI. 
 
Objective
Last 24 Hrs of Vital Signs/I&O
 Vital Signs
 
 
Date Time Temp Pulse Resp B/P B/P Pulse O2 O2 Flow FiO2
 
     Mean Ox Delivery Rate 
 
 0824  83  108/70     
 
 0800       Room Air  
 
 0619 98.7 83 18 108/70  96 Room Air  
 
 2201 98.9 85 18 112/80  95   
 
 1545       Room Air Room Air 
 
 
 Intake & Output
 
 
  1600  0800  0000
 
Intake Total  600 600
 
Output Total 
 
Balance -375 200 -675
 
    
 
Intake, IV  600 
 
Intake, Oral  0 600
 
Output, Urine 
 
Patient 161 lb  
 
Weight   
 
 
 
 
Physical Exam
General Appearance: Alert, Oriented X3, Cooperative, No Acute Distress
Skin: right foot s/p I&D 
HEENT: Atraumatic
Neck: Supple, No JVD
Cardiovascular: Regular Rate, Normal S1, Normal S2
Lungs: Clear to Auscultation
Abdomen: Soft, No Tenderness
Neurological: Normal Speech
Extremities: No Edema
 
Assessment/Plan
Assessment:
US-DUPLEX SCAN LOWER EXT ARTER - 
1. Normal velocities throughout the right lower extremity without evidence of
velocity shift. Therefore, no hemodynamically significant stenosis was
visualized.
 
MRI-RT FOOT W/O & W JOSE A - 
- Findings consistent with osteomyelitis involving the distal and middle
phalanges, as well as head of the proximal phalanx, of the fourth toe.
- Soft tissue edema/cellulitis without abscess.
- Hallux valgus and moderate osteoarthritis of great toe metatarsophalangeal
joint.
 
56 YO M with PMH of HTN, BPH, DM, bladder mass who comes in for CC of RLE pain. 
RLE xray in ED showed "Rarefaction of the bones of the middle and distal phalanx
of the fourth digit is seen with large lytic lesion and cortical erosion along 
the medial aspect of the middle phalanx and
medial base of the distal phalanx, suspicious for osteomyelitis" with likely 
exension into the joint. Pt is afebrile without leukocytosis and was given one 
dose of ceftaz/vanco in ED. 
 
Osteomyelitis and dry gangrene of the 4th digit in Right Lower Extremity: 
* Podiatry consulted; s/p Open I&D surgical intervention ; podiatry revision
for right foot osteomyelitis scheduled for 
* Surgical procedure with podiatry: Open Incision and Drainage of the right foot
* Pt. open to Rehab placement after surgical operation
* Blood Cx.: Gram Negative Robel, Bacillus species, started on Unasyn 3G IV q6h
* pain mgmt
* Gross specimen grew GP cocci, scant GNR; continue IV Unasyn and await return 
to OR for podiatry follow up later this week; polymicrobial osteomyelitis (
Klebsiella, Diphtheroids, Alpha Strep, Staph Aureus) with concern for fourth 
metatarsal involvement, will likely require long term anbitiotic therapy with 
PICC line
* PICC placement; placed 
* US Duplex Arterial; no visualized arterial stenosis 
 
Diabetes Mellitus:
* RISS
* FS
* Patient needs diabetic education
* HBA1C
* Continue low dose Lisinopril
* Hold home regimen
 
Schizophrenia:
* Obtain psych med records
* Holdling off his all meds at this time
 
FC
Problem List:
 1. Osteomyelitis
 
Pain Ratin
Pain Location:
na
Pain Goal: Remain pain free
Pain Plan:
na
Tomorrow's Labs & Rationales:
Not indicated presently
Consulting Request:
   Consulting Specialty: Infectious Disease
   Consulting Physician:
Dr. Crockett
   Reason for Consult: Infection, Osteomyelitis

## 2018-08-17 NOTE — INTERVENTIONAL RADIOLOGY RPT
CLINICAL HISTORY:
This patient is a 57-year-old man with a history of osteomyelitis, who
presents to interventional radiology for placement of a single lumen PICC for
central venous access.
 
PROCEDURES:
1. Real-time ultrasound-guided access into the left basilic vein after
documentation of selected vessel patency, and permanent imaging storing in
the patient records.
2. Placement of a PICC.
 
PHYSICIANS:
Dr. Cee (attending).
 
MEDICATIONS:
6 mL of 1% lidocaine SQ.
 
COMPLICATIONS: None.
ESTIMATED BLOOD LOSS: <5  mL.
SPECIMENS: None.
FLUOROSCOPY TIME: 21 seconds.
DOSE AREA PRODUCT: 4.6E-1 uGym2
 
PROCEDURE NOTE:
Informed consent was obtained from the patient prior to the procedure. During
this process, the procedure and potential alternatives were explained along
with the intended outcome and benefits. The risks of the procedure, including
the possibility of an unsuccessful procedure, as well as the risk of not
doing the procedure, were discussed. The patient was given the opportunity to
ask questions regarding the procedure and appeared competent to make
decisions. A signed consent form documenting this discussion was placed in
the medical record.
 
TIME OUT -
A time-out procedure was performed.
 
MAXIMAL STERILE BARRIER -
All elements of maximal sterile barrier technique followed including use of
cap, mask, sterile gown, sterile gloves, a sterile full body drape and hand
hygiene. Also followed skin preparation with 2% chlorhexidine for cutaneous
antisepsis, and sterile ultrasound preparation with sterile gel and probe
cover when applicable.
 
ULTRASOUND-GUIDED VASCULAR ACCESS -
Ultrasound was used to identify the left basilic vein. The left basilic vein
was confirmed to be patent. Real time imaging confirmed needle access into
the left basilic vein. An image was saved for permanent recording in PACS.
 
The patient was placed supine on the fluoroscopy table. Prior to prepping the
patient, a limited sonogram of the left arm was performed to choose
appropriate access, and this arm was prepped and draped in the usual sterile
fashion. Venous access was achieved into the left basilic vein using
ultrasound and fluoroscopic guidance. The 0.018 measuring wire from the PICC
was advanced into the cavoatrial junction. The needle was removed and
replaced with the peel away sheath.  The intravascular length was measured
and the catheter was trimmed to the correct length. The inner dilator was
removed and the PICC was advanced over the wire into the cavoatrial junction.
The peel away sheath and wire were removed.  The catheter was tested
successfully and secured to the skin with its tip in the cavoatrial junction.
The catheter was heparinized with 4 mL of 100U/mL heparin solution. A spot
image was taken to document final catheter position. The catheter was
sterilely dressed in usual fashion with a Biopatch and a Tegaderm. The
patient was transferred to recovery in stable condition.
 
FINDINGS:
1. Patent left basilic vein.
2. Successful placement of a 4 Fr single lumen PICC that measures 42 cm in
length.
 
IMPRESSION:
Successful and uncomplicated placement of a PICC.
 
PLAN:
1. The patient was stable after the procedure and was transferred to the
interventional recovery area. The patient will be transferred to the floor.
2. The catheter may be used immediately.

## 2018-08-17 NOTE — OPERATIVE REPORT
Operative/Inv Procedure Report
Surgery Date: 08/17/18
Name of Procedure:
1 open incision and drainage deep to the deep fascia with exposure of the 
extensor and flexor tendon and tendon sheath multiple sites right foot
2 delayed primary closure of open surgical wound with local random advancement 
flap
3 partial, revisional fourth ray resection right foot
4 excisional debridement
Pre-Operative Diagnosis:
1 open, necrotic wound right foot
2 osteomyelitis right foot
3 diabetic peripheral neuropathy
Post-Operative Diagnosis:
The same
Estimated Blood Loss: less than 50ml
Surgeon/Assistant:
SHARON BROCK DPM
 
Anesthesia: moderate sedation, block
 
Operative/Procedure Note
Note:
After obtaining informed consent the patient was brought to the operating room 
and placed on the operating table in the supine position.  The patient was then 
securely fastened to the operating table utilizing a safety belt.  After 
administration of IV sedation, 10 cc of 0.5% Marcaine plain was infiltrated 
about the patient's right ankle.  The right foot and ankle were then scrubbed, 
prepped and draped in the usual aseptic manner.  Attention directed to the right
foot, where a large full-thickness necrotic was identified.  A 15 blade was 
utilized sharply advise skin margins.  The dissection was then carried down deep
to the deep fascia with exposure of the extensor and flexor tendon tenderness to
multiple sites, approximately distally.  All necrotic, nonviable infected tissue
was sharply evacuated from the wound bed.  The dissection was then continued 
proximally where the periosteum overlying the stump of the metatarsal was 
incised and reflected.  A sagittal bone saw was utilized to resect the distal to
expose centimeters of bone.  The osseous segment was freed and passed from the 
operative field.  Specimen sent for pathologic inspection.  The open wound was 
then irrigated with 3 L of normal sterile saline infused with 50,000 units of 
bacitracin.  Following this, the foot was redraped and the surgeons top gloves 
were exchanged for clean gloves.  Any bleeding vessels identified were 
cauterized or ligated as encountered.  Next, a dorsomedial and dorsolateral flap
was developed with undermining, mobilization and advancement of the adjacent 
tissues centrally.  The deep size of the flap were held together with 3-0 
Vicryl.  The skin edges were then reapproximated with 3-0 nylon.  The incision 
was dressed with Xeroform, 4 x 4's, Kerlix and an Ace wrap.  The patient was 
noted to tolerate both procedure and anesthesia well and the patient was 
transported from the operating room to recovery with vital signs stable vascular
status intact about the dorsal medial and dorsal lateral flaps.

## 2018-08-17 NOTE — PN- INFECT DX
Subjective
Subjective:
Afebrile without complaints.
 
Objective
Last 24 Hrs of Vital Signs/I&O
 Vital Signs
 
 
Date Time Temp Pulse Resp B/P B/P Pulse O2 O2 Flow FiO2
 
     Mean Ox Delivery Rate 
 
08/17 1430 99.1 68 18 120/70  98   
 
08/17 1424       Room Air Room Air 
 
08/17 1355       Room Air Room Air 
 
08/17 0824  83  108/70     
 
08/17 0800       Room Air  
 
08/17 0619 98.7 83 18 108/70  96 Room Air  
 
08/16 2201 98.9 85 18 112/80  95   
 
08/16 1545       Room Air Room Air 
 
 
 Intake & Output
 
 
 08/17 1600 08/17 0800 08/17 0000
 
Intake Total  600 600
 
Output Total 
 
Balance -600 200 -675
 
    
 
Intake, IV  600 
 
Intake, Oral  0 600
 
Output, Urine 
 
Patient 161 lb  
 
Weight   
 
 
 
 
Physical Exam
Other Physical Findings:
Exam without change
PICC has been placed in the left upper extremity
 
 
Results
Last 24 Hours of Lab Results:
No labs from today
 
Last 24 Hours of Fer Results:
OR culture August 14 labeled right foot bone positive for Klebsiella pneumoniae 
resistant to Ampicillin, Staph aureus, sensitive to Oxacillin, alpha strep, 
Bacteroides, probable fragilis and diphtheroids
 
 
Assessment/Plan ID
Impression:
Stable, with his temperatures and white blood cell count remaining normal, on 
Unasyn for a polymicrobial osteomyelitis, status post I&D of the right foot with
an open, partial fourth ray resection 3 days ago, with plans for a return to the
OR today for further debridement and probable wound closure.  As previously 
noted, based on the concern of fourth metatarsal involvement, he will require a 
prolonged course of antibiotics for residual osteomyelitis.
 
Suggestion:
1.  Await return to the OR later today
2.  Would obtain a postop baseline ESR and x-ray of the right foot
3.  Continue Unasyn to plan on a 4 week course of antibiotics from today (until 
September 14)
4.  Weekly ESR while on Unasyn

## 2018-08-17 NOTE — RADIOLOGY REPORT
EXAMINATION:
XR FOOT, RIGHT
 
CLINICAL INFORMATION:
Status post surgery for osteomyelitis.
 
COMPARISON:
Right foot 08/12/2018.
 
TECHNIQUE:
AP, lateral, and oblique views of the right foot.
 
FINDINGS:
Status post resection of the fourth toe with amputation at the mid fourth
metatarsal. There is air in the soft tissues adjacent to the osteotomy site.
There is no bone destruction. There is no abnormal periosteal reaction.
 
There is degenerative joint narrowing subchondral sclerosis of the first
metatarsal phalangeal joint.
 
IMPRESSION:
Status post resection of the fourth toe at the mid metatarsal..

## 2018-08-18 VITALS — SYSTOLIC BLOOD PRESSURE: 138 MMHG | DIASTOLIC BLOOD PRESSURE: 70 MMHG

## 2018-08-18 VITALS — DIASTOLIC BLOOD PRESSURE: 64 MMHG | SYSTOLIC BLOOD PRESSURE: 120 MMHG

## 2018-08-18 VITALS — SYSTOLIC BLOOD PRESSURE: 128 MMHG | DIASTOLIC BLOOD PRESSURE: 84 MMHG

## 2018-08-18 LAB
ABSOLUTE GRANULOCYTE CT: 5.1 /CUMM (ref 1.4–6.5)
BASOPHILS # BLD: 0 /CUMM (ref 0–0.2)
BASOPHILS NFR BLD: 0.6 % (ref 0–2)
EOSINOPHIL # BLD: 0.2 /CUMM (ref 0–0.7)
EOSINOPHIL NFR BLD: 3 % (ref 0–5)
ERYTHROCYTE [DISTWIDTH] IN BLOOD BY AUTOMATED COUNT: 12.2 % (ref 11.5–14.5)
GRANULOCYTES NFR BLD: 70.2 % (ref 42.2–75.2)
HCT VFR BLD CALC: 35.1 % (ref 42–52)
LYMPHOCYTES # BLD: 1.3 /CUMM (ref 1.2–3.4)
MCH RBC QN AUTO: 30 PG (ref 27–31)
MCHC RBC AUTO-ENTMCNC: 33.5 G/DL (ref 33–37)
MCV RBC AUTO: 89.5 FL (ref 80–94)
MONOCYTES # BLD: 0.6 /CUMM (ref 0.1–0.6)
PLATELET # BLD: 359 /CUMM (ref 130–400)
PMV BLD AUTO: 7.6 FL (ref 7.4–10.4)
RED BLOOD CELL CT: 3.93 /CUMM (ref 4.7–6.1)
WBC # BLD AUTO: 7.2 /CUMM (ref 4.8–10.8)

## 2018-08-18 NOTE — PN- HOUSESTAFF
bAdoul Cash 18 0855:
Subjective
Follow-up For:
Osteomyelitis
Subjective:
I visited the patient today, he was lying back in his bed, alert and oriented 3
, in no acute distress.  He reports that he has pain in his right foot.  Had a 
normal bowel movement.
There was no complaints or reports from nurses regarding chest pain, 
lightheadedness, dizziness, shortness of breath, abdominal pain, diarrhea, 
constipation, fever, chills, sweating, dysuria, frequency.
 
Review of Systems
Constitutional:
Reports: see HPI. 
 
Objective
Last 24 Hrs of Vital Signs/I&O
 Vital Signs
 
 
Date Time Temp Pulse Resp B/P B/P Pulse O2 O2 Flow FiO2
 
     Mean Ox Delivery Rate 
 
 0855 98.2 81 20 128/84     
 
 0606 98.2 81 20 128/84  97 Room Air  
 
 2238 99.7 84 20 136/70  97 Room Air  
 
 1430 99.1 68 18 120/70  98   
 
 1424       Room Air Room Air 
 
 1355       Room Air Room Air 
 
 
 Intake & Output
 
 
  1600  0800  0000
 
Intake Total  600 
 
Output Total  1075 450
 
Balance  -475 -450
 
    
 
Intake, IV  600 
 
Number   1
 
Bowel   
 
Movements   
 
Output, Urine  1075 450
 
 
 
 
Physical Exam
General Appearance: Alert, Oriented X3, Cooperative, No Acute Distress
Skin: No Rashes
Skin Temp/Moisture Exam: Warm/Dry
Sepsis Skin Exam (color): Normal for Ethnicity
HEENT: Atraumatic
Cardiovascular: Regular Rate, Normal S1, Normal S2
Lungs: Clear to Auscultation, Normal Air Movement
Abdomen: Normal Bowel Sounds, Soft, No Tenderness
Neurological: Normal Speech
 
Assessment/Plan
Assessment:
US-DUPLEX SCAN LOWER EXT ARTER - 
1. Normal velocities throughout the right lower extremity without evidence of
velocity shift. Therefore, no hemodynamically significant stenosis was
visualized.
 
MRI-RT FOOT W/O & W JOSE A - 
- Findings consistent with osteomyelitis involving the distal and middle
phalanges, as well as head of the proximal phalanx, of the fourth toe.
- Soft tissue edema/cellulitis without abscess.
- Hallux valgus and moderate osteoarthritis of great toe metatarsophalangeal
joint.
 
58 YO M with PMH of HTN, BPH, DM, bladder mass who comes in for CC of RLE pain. 
RLE xray in ED showed "Rarefaction of the bones of the middle and distal phalanx
of the fourth digit is seen with large lytic lesion and cortical erosion along 
the medial aspect of the middle phalanx and
medial base of the distal phalanx, suspicious for osteomyelitis" with likely 
exension into the joint. Pt is afebrile without leukocytosis and was given one 
dose of ceftaz/vanco in ED. 
 
Osteomyelitis and dry gangrene of the 4th digit in Right Lower Extremity: 
* Podiatry consulted; s/p Open I&D surgical intervention ; podiatry revision
for right foot osteomyelitis scheduled for 
* Surgical procedure with podiatry: Open Incision and Drainage of the right foot
* Pt. open to Rehab placement after surgical operation
* Blood Cx.: Gram Negative Robel, Bacillus species, started on Unasyn 3G IV q6h
* pain mgmt
* Gross specimen grew GP cocci, scant GNR; continue IV Unasyn and await return 
to OR for podiatry follow up later this week; polymicrobial osteomyelitis (
Klebsiella, Diphtheroids, Alpha Strep, Staph Aureus) with concern for fourth 
metatarsal involvement, will likely require long term anbitiotic therapy with 
PICC line
* PICC placement; placed 
* US Duplex Arterial; no visualized arterial stenosis 
 
Diabetes Mellitus:
* RISS
* FS
* Patient needs diabetic education
* HBA1C
* Continue low dose Lisinopril
* Hold home regimen
 
Schizophrenia:
* Obtain psych med records
* Holdling off his all meds at this time
 
FC
Problem List:
 1. Osteomyelitis
 
Pain Ratin
Pain Location:
Right foot
Pain Goal: Pain 4 or less
Pain Plan:
Lidocaine
Percocet
Acetaminophen
Tomorrow's Labs & Rationales:
As indicated
Consulting Request:
   Consulting Specialty: Infectious Disease
   Consulting Physician:
Dr. Crockett
   Reason for Consult: Infection, Osteomyelitis
 
 
Amalia Lemus 18 1450:
Attending MD Review Statement
 
Attending Statement
Attending MD Statement: examined this patient, discuss w/resident/PA/NP, agreed 
w/resident/PA/NP, reviewed EMR data (avail)
Attending Assessment/Plan:
Rt feet osteomyelitis- s/p surgery on   and revision surgery on  with -
open incision and drainage deep to the deep fascia with exposure of the extensor
and flexor tendon and tendon sheath multiple sites right foot; delayed primary 
closure of open surgical wound with local random advancement flap; partial, 
revisional fourth ray resection right foot and excisional debridement.
 
Pt on iv abx for now and has a PICC line already placed. plan is to dc him on 
monday. d/w pt the care plan.

## 2018-08-19 VITALS — DIASTOLIC BLOOD PRESSURE: 82 MMHG | SYSTOLIC BLOOD PRESSURE: 118 MMHG

## 2018-08-19 VITALS — DIASTOLIC BLOOD PRESSURE: 74 MMHG | SYSTOLIC BLOOD PRESSURE: 148 MMHG

## 2018-08-19 VITALS — DIASTOLIC BLOOD PRESSURE: 68 MMHG | SYSTOLIC BLOOD PRESSURE: 132 MMHG

## 2018-08-19 VITALS — SYSTOLIC BLOOD PRESSURE: 120 MMHG | DIASTOLIC BLOOD PRESSURE: 70 MMHG

## 2018-08-19 NOTE — PN- HOUSESTAFF
Abdoul Cash 18 0830:
Subjective
Follow-up For:
Osteomyelitis
Subjective:
I visited the patient today, he was lying back in his bed, alert and oriented 3
, in no acute distress.  He did not complain of pain in his right foot.  Had a 
normal bowel movement.
There was no complaints or reports from nurses regarding chest pain, 
lightheadedness, dizziness, shortness of breath, abdominal pain, diarrhea, 
constipation, fever, chills, sweating, dysuria, frequency.
 
Review of Systems
Constitutional:
Reports: see HPI. 
 
Objective
Last 24 Hrs of Vital Signs/I&O
 Vital Signs
 
 
Date Time Temp Pulse Resp B/P B/P Pulse O2 O2 Flow FiO2
 
     Mean Ox Delivery Rate 
 
 0809 98.7 84 16 132/68     
 
 0749 98.7 84 16 132  97 Room Air  
 
 2202 98.9 87 20 138/70  97   
 
 
 Intake & Output
 
 
  1600  0800  0000
 
Intake Total  380 480
 
Output Total 200 1025 700
 
Balance -200 -645 -220
 
    
 
Intake, IV  200 
 
Intake, Oral  180 480
 
Output, Urine 200 1025 700
 
 
 
 
Physical Exam
General Appearance: Alert, Oriented X3, Cooperative, No Acute Distress
Skin: No Rashes
Skin Temp/Moisture Exam: Warm/Dry
Cardiovascular: Regular Rate, Normal S1, Normal S2
Lungs: Clear to Auscultation, Normal Air Movement
 
Assessment/Plan
Assessment:
US-DUPLEX SCAN LOWER EXT ARTER - 
1. Normal velocities throughout the right lower extremity without evidence of
velocity shift. Therefore, no hemodynamically significant stenosis was
visualized.
 
MRI-RT FOOT W/O & W JOSE A - 
- Findings consistent with osteomyelitis involving the distal and middle
phalanges, as well as head of the proximal phalanx, of the fourth toe.
- Soft tissue edema/cellulitis without abscess.
- Hallux valgus and moderate osteoarthritis of great toe metatarsophalangeal
joint.
 
56 YO M with PMH of HTN, BPH, DM, bladder mass who comes in for CC of RLE pain. 
RLE xray in ED showed "Rarefaction of the bones of the middle and distal phalanx
of the fourth digit is seen with large lytic lesion and cortical erosion along 
the medial aspect of the middle phalanx and
medial base of the distal phalanx, suspicious for osteomyelitis" with likely 
exension into the joint. Pt is afebrile without leukocytosis and was given one 
dose of ceftaz/vanco in ED. 
 
Osteomyelitis and dry gangrene of the 4th digit in Right Lower Extremity: 
* Podiatry consulted; s/p Open I&D surgical intervention ; podiatry revision
for right foot osteomyelitis scheduled for 
* Surgical procedure with podiatry: Open Incision and Drainage of the right foot
* Pt. open to Rehab placement after surgical operation
* Blood Cx.: Gram Negative Robel, Bacillus species, started on Unasyn 3G IV q6h
* pain mgmt
* Gross specimen grew GP cocci, scant GNR; continue IV Unasyn and await return 
to OR for podiatry follow up later this week; polymicrobial osteomyelitis (
Klebsiella, Diphtheroids, Alpha Strep, Staph Aureus) with concern for fourth 
metatarsal involvement, will likely require long term anbitiotic therapy with 
PICC line
* PICC placement; placed 
* US Duplex Arterial; no visualized arterial stenosis 
 
Diabetes Mellitus:
* RISS
* FS
* Patient needs diabetic education
* HBA1C
* Continue low dose Lisinopril
* Hold home regimen
 
Schizophrenia:
* Obtain psych med records
* Holdling off his all meds at this time
 
FC
Problem List:
 1. Osteomyelitis
 
Pain Ratin
Pain Location:
Not applicable
Pain Goal: Remain pain free
Pain Plan:
Not applicable
Tomorrow's Labs & Rationales:
As indicated
Consulting Request:
   Consulting Specialty: Infectious Disease
   Consulting Physician:
Dr. Crockett
   Reason for Consult: Infection, Osteomyelitis
 
 
Lexie Lemusmargo 18 1600:
Attending MD Review Statement
 
Attending Statement
Attending MD Statement: examined this patient, discuss w/resident/PA/NP, agreed 
w/resident/PA/NP, reviewed EMR data (avail)
Attending Assessment/Plan:
Rt feet osteomyelitis- s/p surgery on   and revision surgery on  with -
open incision and drainage deep to the deep fascia with exposure of the extensor
and flexor tendon and tendon sheath multiple sites right foot; delayed primary 
closure of open surgical wound with local random advancement flap; partial, 
revisional fourth ray resection right foot and excisional debridement.
Pt on iv abx ( unasyn)for now and has a PICC line already placed. plan is to dc 
him on monday. d/w pt the care plan. 
 
Uncontrolled DM- with aic of 12. will get endocrine consult in am. His sugars 
staying above 200 most of the time. will change his levemir to 5 U BiD and will 
give 8 U tonight. 
 
d/w pt the care plan.

## 2018-08-20 VITALS — DIASTOLIC BLOOD PRESSURE: 92 MMHG | SYSTOLIC BLOOD PRESSURE: 142 MMHG

## 2018-08-20 VITALS — DIASTOLIC BLOOD PRESSURE: 80 MMHG | SYSTOLIC BLOOD PRESSURE: 120 MMHG

## 2018-08-20 VITALS — DIASTOLIC BLOOD PRESSURE: 62 MMHG | SYSTOLIC BLOOD PRESSURE: 102 MMHG

## 2018-08-20 NOTE — PN- INFECT DX
Subjective
Subjective:
Afebrile.  He notes mild discount in the right foot
 
 
Objective
Last 24 Hrs of Vital Signs/I&O
 Vital Signs
 
 
Date Time Temp Pulse Resp B/P B/P Pulse O2 O2 Flow FiO2
 
     Mean Ox Delivery Rate 
 
08/20 1108       Room Air Room Air 
 
08/20 0820  76  142/92     
 
08/20 0800       Room Air  
 
08/20 0631 99.4 76 20 142/92  99 Room Air  
 
08/19 2145 98.6 77 20 118/82  97   
 
08/19 1427 98.5 71 20 120/70  100 Room Air  
 
 
 Intake & Output
 
 
 08/20 1600 08/20 0800 08/20 0000
 
Intake Total 500 600 200
 
Output Total 200 700 200
 
Balance 300 -100 0
 
    
 
Intake, IV  300 
 
Intake, Oral 500 300 200
 
Number 1  
 
Bowel   
 
Movements   
 
Output, Urine 200 700 200
 
 
 
 
Physical Exam
Other Physical Findings:
He appears comfortable in no acute distress
Extremities right foot wound clean, with no inflammation; PICC in place in the 
left upper extremity with no inflammation at the site
 
 
Results
Last 24 Hours of Lab Results:
 Laboratory Tests
 
 
 08/19
 
 0710
 
Chemistry 
 
  Sodium (137 - 145 mmol/L) 136  L
 
  Potassium (3.5 - 5.1 mmol/L) 4.4
 
  Chloride (98 - 107 mmol/L) 102
 
  Carbon Dioxide (22 - 30 mmol/L) 26
 
  Anion Gap (5 - 16) 7
 
  BUN (9 - 20 mg/dL) 11
 
  Creatinine (0.7 - 1.2 mg/dL) 0.7
 
  Estimated GFR (>60 ml/min) > 60
 
  BUN/Creatinine Ratio (7 - 25 %) 15.7
 
 
 
Last 24 Hours of Fer Results:
No new cultures
 
Recent Imaging Studies:
X-ray of the right foot August 17 status post resection of the fourth toe at the
mid metatarsal, with no abnormal periosteal reaction
 
 
Assessment/Plan ID
Impression:
Stable, with his temperatures and white blood cell count remaining normal, on 
Unasyn for a polymicrobial osteomyelitis, status post partial, revisional fourth
ray resection of the right foot with delayed closure 3 days ago.  Based on the 
concern of residual osteomyelitis of the fourth metatarsal he will require a 4 
week course of antibiotics from his most recent debridement. 
 
Suggestion:
1.  Continue Unasyn to complete a 4 week course of antibiotics from his most 
recent debridement (until September 14)
2.  Weekly ESR while on Unasyn

## 2018-08-20 NOTE — PN- HOUSESTAFF
**See Addendum**
Subjective
Follow-up For:
Osteomyelitis
Subjective:
Afebrile overnight. Patient is seen and examined laying in bed. Patient denies 
any leg or foot pain today. Patient however states he has had a genital itch 
since yesterday, currently being treated with Nystatin. Patient otherwise denies
any n/v, diarrhea, constipation, chest pain, and shortness of breath. Patient 
scheduled to be discharged to [facility] today.
 
Review of Systems
Constitutional:
Reports: see HPI. 
 
Objective
Last 24 Hrs of Vital Signs/I&O
 Vital Signs
 
 
Date Time Temp Pulse Resp B/P B/P Pulse O2 O2 Flow FiO2
 
     Mean Ox Delivery Rate 
 
 0631 99.4 76 20 142/92  99 Room Air  
 
 2145 98.6 77 20 118/82  97   
 
 1427 98.5 71 20 120/70  100 Room Air  
 
 
 Intake & Output
 
 
  1600  0800  0000
 
Intake Total  600 200
 
Output Total  700 200
 
Balance  -100 0
 
    
 
Intake, IV  300 
 
Intake, Oral  300 200
 
Output, Urine  700 200
 
 
 
 
Physical Exam
General Appearance: Alert, Oriented X3, Cooperative, No Acute Distress
Skin: right foot s/p I&D
HEENT: Atraumatic
Neck: Supple, No JVD
Cardiovascular: Regular Rate, Normal S1, Normal S2
Lungs: Clear to Auscultation
Abdomen: Soft, No Tenderness
Neurological: Normal Speech
Extremities: No Edema
 
Assessment/Plan
Assessment:
US-DUPLEX SCAN LOWER EXT ARTER - 
1. Normal velocities throughout the right lower extremity without evidence of
velocity shift. Therefore, no hemodynamically significant stenosis was
visualized.
 
MRI-RT FOOT W/O & W JOSE A - 
- Findings consistent with osteomyelitis involving the distal and middle
phalanges, as well as head of the proximal phalanx, of the fourth toe.
- Soft tissue edema/cellulitis without abscess.
- Hallux valgus and moderate osteoarthritis of great toe metatarsophalangeal
joint.
 
56 YO M with PMH of HTN, BPH, DM, bladder mass who comes in for CC of RLE pain. 
RLE xray in ED showed "Rarefaction of the bones of the middle and distal phalanx
of the fourth digit is seen with large lytic lesion and cortical erosion along 
the medial aspect of the middle phalanx and
medial base of the distal phalanx, suspicious for osteomyelitis" with likely 
exension into the joint. Pt is afebrile without leukocytosis and was given one 
dose of ceftaz/vanco in ED. 
 
Osteomyelitis and dry gangrene of the 4th digit in Right Lower Extremity: 
* Podiatry consulted; s/p Open I&D surgical intervention ; podiatry revision
for right foot osteomyelitis 
* Surgical procedure with podiatry: Open Incision and Drainage of the right foot
* Pt. open to Rehab placement after surgical operation
* Blood Cx.: Gram Negative Robel, Bacillus species, started on Unasyn 3G IV q6h
* pain mgmt
* Gross specimen grew GP cocci, scant GNR; continue IV Unasyn and await return 
to OR for podiatry follow up later this week; polymicrobial osteomyelitis (
Klebsiella, Diphtheroids, Alpha Strep, Staph Aureus) with concern for fourth 
metatarsal involvement, will likely require long term anbitiotic therapy with 
PICC line
* PICC placement; placed ; cont. Unasyn to complete 4 week course from last
debridement (until )
* Weekly ESR while on Unasyn, last ESR 84 ()
* US Duplex Arterial; no visualized arterial stenosis 
 
Diabetes Mellitus:
* RISS
* FS
* Patient needs diabetic education
* HBA1C
* Continue low dose Lisinopril
* Hold home regimen
 
Schizophrenia:
* Obtain psych med records
* Holdling off his all meds at this time
 
Problem List:
 1. Osteomyelitis
 
Pain Ratin
Pain Location:
NA
Pain Goal: Remain pain free
Pain Plan:
NA
Tomorrow's Labs & Rationales:
Routine
Consulting Request:
   Consulting Specialty: Infectious Disease
   Consulting Physician:
Dr. Crockett
   Reason for Consult: Infection, Osteomyelitis

## 2018-08-21 VITALS — SYSTOLIC BLOOD PRESSURE: 128 MMHG | DIASTOLIC BLOOD PRESSURE: 70 MMHG

## 2018-08-21 VITALS — DIASTOLIC BLOOD PRESSURE: 76 MMHG | SYSTOLIC BLOOD PRESSURE: 122 MMHG

## 2018-08-21 VITALS — DIASTOLIC BLOOD PRESSURE: 70 MMHG | SYSTOLIC BLOOD PRESSURE: 126 MMHG

## 2018-08-21 NOTE — PN- HOUSESTAFF
**See Addendum**
Subjective
Follow-up For:
Osteomyelitis
Subjective:
Afebrile overnight. Patient is awake and sitting in bed comfortably. Patient 
reports no pain in his legs and has been ambulating with his walker. Patient 
reports he notices his genital rash has improved with the Nystatin powder. 
Patient likely will be discharged today to [facility]. Patient otherwise denies any 
diarrhea, constipation, fevers, chills, fatigue, chest pain, and shortness of 
breath.
 
Review of Systems
Constitutional:
Reports: see HPI. 
 
Objective
Last 24 Hrs of Vital Signs/I&O
 Vital Signs
 
 
Date Time Temp Pulse Resp B/P B/P Pulse O2 O2 Flow FiO2
 
     Mean Ox Delivery Rate 
 
 1345       Room Air Room Air 
 
 1343       Room Air Room Air 
 
 0846  84  128/70     
 
 0612 98.8 84 22 122/76  97 Room Air  
 
 2201 97.7 78 18 102/62  95 Room Air  
 
 1554 98.4 77 20 120/80  98   
 
 
 Intake & Output
 
 
  1600  0800  0000
 
Intake Total  320 120
 
Output Total   400
 
Balance  320 -280
 
    
 
Intake, IV  200 
 
Intake, Oral  120 120
 
Output, Urine   400
 
 
 
 
Physical Exam
General Appearance: Alert, Oriented X3, Cooperative, No Acute Distress
Skin: right foot s/p I&D
HEENT: Atraumatic
Neck: Supple, No JVD
Cardiovascular: Regular Rate, Normal S1, Normal S2
Lungs: Clear to Auscultation
Abdomen: Soft, No Tenderness
Neurological: Normal Speech
Extremities: No Edema
 
Assessment/Plan
Assessment:
US-DUPLEX SCAN LOWER EXT ARTER - 
1. Normal velocities throughout the right lower extremity without evidence of
velocity shift. Therefore, no hemodynamically significant stenosis was
visualized.
 
MRI-RT FOOT W/O & W JOSE A - 
- Findings consistent with osteomyelitis involving the distal and middle
phalanges, as well as head of the proximal phalanx, of the fourth toe.
- Soft tissue edema/cellulitis without abscess.
- Hallux valgus and moderate osteoarthritis of great toe metatarsophalangeal
joint.
 
56 YO M with PMH of HTN, BPH, DM, bladder mass who comes in for CC of RLE pain. 
RLE xray in ED showed "Rarefaction of the bones of the middle and distal phalanx
of the fourth digit is seen with large lytic lesion and cortical erosion along 
the medial aspect of the middle phalanx and
medial base of the distal phalanx, suspicious for osteomyelitis" with likely 
exension into the joint. Pt is afebrile without leukocytosis and was given one 
dose of ceftaz/vanco in ED. 
 
Osteomyelitis and dry gangrene of the 4th digit in Right Lower Extremity: 
* Podiatry consulted; s/p Open I&D surgical intervention ; podiatry revision
for right foot osteomyelitis 
* Surgical procedure with podiatry: Open Incision and Drainage of the right foot
* Pt. open to rehab placement after surgical operation, awaiting placement for 
rehab
* Blood Cx.: Gram Negative Robel, Bacillus species, started on Unasyn 3G IV q6h
* pain mgmt
* Gross specimen grew GP cocci, scant GNR; continue IV Unasyn and await return 
to OR for podiatry follow up later this week; polymicrobial osteomyelitis (
Klebsiella, Diphtheroids, Alpha Strep, Staph Aureus) with concern for fourth 
metatarsal involvement, will likely require long term anbitiotic therapy with 
PICC line
* PICC placement; placed ; cont. Unasyn to complete 4 week course from last
debridement (until )
* Weekly ESR while on Unasyn, last ESR 84 ()
* US Duplex Arterial; no visualized arterial stenosis 
 
Diabetes Mellitus:
* RISS
* FS
* Patient needs diabetic education
* HBA1C
* Continue low dose Lisinopril
* Hold home regimen
 
Schizophrenia:
* Pt. stable upon admission, held his psych medications initially prior to 
confirming with [facility] regarding 
patient's home medications; thereupon, we restarted patient's Risperidone upon 
completing an EKG (to rule out any QT Prolongation)
* Pt. is tolerating medications and has no active issues
 
Problem List:
 1. Osteomyelitis
 
Pain Ratin
Pain Location:
NA
Pain Goal: Remain pain free
Pain Plan:
NA
Tomorrow's Labs & Rationales:
Routine
Consulting Request:
   Consulting Specialty: Infectious Disease
   Consulting Physician:
Dr. Crockett
   Reason for Consult: Infection, Osteomyelitis

## 2018-08-22 VITALS — DIASTOLIC BLOOD PRESSURE: 70 MMHG | SYSTOLIC BLOOD PRESSURE: 130 MMHG

## 2018-08-22 VITALS — SYSTOLIC BLOOD PRESSURE: 110 MMHG | DIASTOLIC BLOOD PRESSURE: 64 MMHG

## 2018-08-22 VITALS — DIASTOLIC BLOOD PRESSURE: 80 MMHG | SYSTOLIC BLOOD PRESSURE: 120 MMHG

## 2018-08-22 NOTE — CONS- PSYCHIATRY
Psychiatric Consult
Date of Consult: 08/22/18
Reason for Consult:
Assessment of psychiatric medication
History of Present Illness:
This 57-year-old male was admitted on August 12 with a necrotic right fourth toe
together with osteomyelitis and gangrene of his right foot.  His toe has been 
amputated and he is being transferred to short-term rehabilitation for ongoing 
antibiotic therapy.  He carries a diagnosis of schizophrenia.
 
The patient's psychiatric medication was held on admission.  Medication was 
recommended by his medical team.  A decision was made not to recommence 
clonazepam or gabapentin.
 
The patient reports that he sees Dr. Keith in [place].  Medical team has 
requested records.  The patient says that he has no seeing Dr. Keith for very 
long.  He takes his medication "when I need it".  The patient reports that when 
he does not take his psychiatric medication "the voices get loud".  His 
hallucinations never go away completely but are currently quiet and not 
distressing.  Reports that his thoughts are clear.  He is not suicidal or 
homicidal.  He takes clonazepam when he gets anxious.   review shows that the
patient has been filling a prescription for clonazepam 1 mg p.o. 3 times a day 
every month although he says he has not been taking it regularly.  The patient 
describes his mood as "good besides having my toe amputated".  He continues to 
struggle with accepting that he has diabetes and admits that he has not been 
adherent to either his diabetic medication or diet.  He reports that he 
sometimes feels like giving up is very Catholic and believes that "God will not
let me".  Because of his face he trusts that his future will improve.
 
Substance abuse history: The patient reports a history of alcohol dependence.  
He reports that for the past 4 or 5 years he has been drinking less frequently 
and now drinks only once or twice a week in moderate amounts.  He reports heavy 
cannabis use for many years but has not used for the past 4 months or so.  He 
may be minimizing his substance abuse history.  1 of his reported difficulties 
at present is that his friends no longer wish to associate with him because he 
has stopped using.
 
Past medical history: Insulin-dependent diabetes, CVA, questionable history of 
bladder cancer, osteomyelitis, gangrene of right foot, schizophrenia
Allergies:
Coded Allergies:
No Known Drug Allergies (Intermediate, NONE 06/06/17)
 
Current Medications:
 Med
 
 
Acetaminophen 650 MG PO Q6P PRN 08/12/18 1215
 
Ampicillin Sodium/Sulbactam Sodium 3,000 MG IV Q6 08/14/18 1800
 
Sodium Chloride 100 ML 
 
Atorvastatin Calcium 40 MG PO DAILY 08/12/18 1150
 
Bupropion HCl 300 MG PO DAILY 08/20/18 1328
 
Docusate Sodium 100 MG PO DAILY 08/16/18 0933
 
Enoxaparin Sodium 40 MG SC DAILY 08/13/18 0900
 
Folic Acid 1 MG PO DAILY 08/13/18 0900
 
Insulin Aspart  SC TIDAC 08/17/18 1700
 
Insulin Aspart  SC AT BEDTIME 08/17/18 2100
 
Insulin Detemir 5 UNITS SC BID 08/20/18 0900
 
Lisinopril 2.5 MG PO DAILY 08/12/18 1358
 
Nystatin 1 LAZARO TOP BID 08/20/18 0001
 
Polyethylene Glycol 17 GM PO DAILY 08/16/18 0933
 
Risperidone 4 MG PO BID 08/20/18 1330
 
Senna/Docusate Sodium 1 TAB PO BID PRN 08/16/18 0945
 
 
 
Past History
 
Past Medical History
Neurological: CVA (occipital), peripheral neuropathy, TIA
EENT: NONE
Cardiovascular: NONE
Respiratory: Pleural effusion
Gastrointestinal: NONE
Hepatic: NONE
Renal: benign prost hyperplasia, bladder cancer 
Musculoskeletal: NONE
Psychiatric: schizophrenia
Endocrine: diabetes
Blood Disorders: NONE
Cancer(s): bladder cancer
GYN/Reproductive: NONE
 
Past Surgical History
Surgical History: s/p TURBT
 
Assessment/Plan
 
Mental Status
Orientation: Person, Place, Situation
Mental Status Exam:
Patient is a 57-year-old -American male encountered lying in his hospital
bed watching TV.  He was very pleasant, calm and appropriate.  Eye contact was 
good.  Speech was low in volume, monotonous, somewhat difficult to understand.  
Patient is edentulous.  Mood was "good", affect was.  Thought process was normal
in tempo, stream and form.  There were no delusions or obsessions.  Auditory 
hallucinations were present, not distressing to the patient.  Attention and 
concentration were good.  Impulse control is good.  Patient is functionally 
illiterate.  Intelligence level likely low average, use of language appropriate,
fund of knowledge average.  Patient's insight is fair, judgment unimpaired.
Diffential Diagnosis:
Schizophrenia, chronic
History of alcohol dependence in partial remission
History of cannabis dependence in early remission
Impression:
The patient is at baseline psychiatrically.  He has some ongoing auditory 
hallucinations which have never responded to medication.  He tolerates these 
well with no distress.  He is not anxious and does not appear to need 
clonazepam.  He has not had any benzodiazepine withdrawal symptoms since 
admission.  He is not suicidal or homicidal.  His current medical illness and 
supportive psychotherapy would be helpful.
Provisional Treatment Plan:
 - Continue risperidone and bupropion as ordered
 - The patient is doing well without benzodiazepines and has exhibited no 
withdrawal symptoms.  Do not see any benefits in recommencing clonazepam or 
gabapentin at this time. 
 -Suggest recommencing trazodone 50 mg p.o. nightly on an as-needed basis.  
Patient was previously prescribed 100 mg and dose may be increased to this if 
necessary.
 -The patient is at risk of an adjustment disorder.  Suggest supportive 
psychotherapy at short-term rehab.
 
Thank you for consulting us on this patient.  Psychiatry will sign off for now. 
Please do not hesitate to contact us if we can be of any further assistance.

## 2018-08-22 NOTE — PN- INFECT DX
Subjective
Subjective:
Afebrile without complaints
 
Objective
Last 24 Hrs of Vital Signs/I&O
 Vital Signs
 
 
Date Time Temp Pulse Resp B/P B/P Pulse O2 O2 Flow FiO2
 
     Mean Ox Delivery Rate 
 
08/22 0821  78  120/78     
 
08/22 0800      97 Room Air  
 
08/22 0700 98.0 79 20 110/64  98   
 
08/21 2131 98.6 82 20 126/70  99   
 
08/21 1427 99.4 81 20 128/70  98 Room Air  
 
08/21 1345       Room Air Room Air 
 
08/21 1343       Room Air Room Air 
 
 
 Intake & Output
 
 
 08/22 1600 08/22 0800 08/22 0000
 
Intake Total  500 930
 
Output Total  1050 700
 
Balance  -550 230
 
    
 
Intake, IV  260 130
 
Intake, Oral  240 800
 
Output, Urine  1050 700
 
 
 
 
Physical Exam
Other Physical Findings:
He appears comfortable in no acute distress
Extremities right foot dressing intact; PICC in the right upper extremity with 
no inflammation at the site
 
 
Results
Last 24 Hours of Lab Results:
No recent labs
 
Last 24 Hours of Fer Results:
No recent cultures
 
 
Assessment/Plan ID
Impression:
Stable, with his temperatures and white blood cell count remaining normal, on 
Unasyn for residual polymicrobial osteomyelitis of the right foot, status post 
partial, revisional fourth ray resection with delayed closure 5 days ago.  
 
Suggestion:
1.  Continue Unasyn until September 14
2.  Weekly ESR while on Unasyn

## 2018-08-22 NOTE — PN- HOUSESTAFF
**See Addendum**
Subjective
Follow-up For:
Osteomyelitis
Subjective:
Afebrile overnight. Patient is seen and examined in bed. Patient is asleep this 
morning but is easily awakened. Patient notes no complaints overnight and ready 
to go to [facility] once we get a bed available. Patient otherwise denies any pain in 
the legs. Patient also denies any n/v, diarrhea, constipation, chest pain, 
shortness of breath, fevers, chills, and fatigue.
 
Review of Systems
Constitutional:
Reports: see HPI. 
 
Objective
Last 24 Hrs of Vital Signs/I&O
 Vital Signs
 
 
Date Time Temp Pulse Resp B/P B/P Pulse O2 O2 Flow FiO2
 
     Mean Ox Delivery Rate 
 
 0821  78  120/78     
 
 0700 98.0 79 20 110/64  98   
 
 2131 98.6 82 20 126/70  99   
 
 1427 99.4 81 20 128/70  98 Room Air  
 
 1345       Room Air Room Air 
 
 1343       Room Air Room Air 
 
 0846  84  128/70     
 
 
 Intake & Output
 
 
  1600  0800  0000
 
Intake Total  500 930
 
Output Total  1050 700
 
Balance  -550 230
 
    
 
Intake, IV  260 130
 
Intake, Oral  240 800
 
Output, Urine  1050 700
 
 
 
 
Physical Exam
General Appearance: Alert, Oriented X3, Cooperative, No Acute Distress
Skin: right foot s/p I&D
HEENT: Atraumatic
Neck: Supple, No JVD
Cardiovascular: Regular Rate, Normal S1, Normal S2
Lungs: Clear to Auscultation
Abdomen: Soft, No Tenderness
Neurological: Normal Speech
Extremities: No Edema
 
Assessment/Plan
Assessment:
US-DUPLEX SCAN LOWER EXT ARTER - 
1. Normal velocities throughout the right lower extremity without evidence of
velocity shift. Therefore, no hemodynamically significant stenosis was
visualized.
 
MRI-RT FOOT W/O & W JOSE A - 
- Findings consistent with osteomyelitis involving the distal and middle
phalanges, as well as head of the proximal phalanx, of the fourth toe.
- Soft tissue edema/cellulitis without abscess.
- Hallux valgus and moderate osteoarthritis of great toe metatarsophalangeal
joint.
 
58 YO M with PMH of HTN, BPH, DM, bladder mass who comes in for CC of RLE pain. 
RLE xray in ED showed "Rarefaction of the bones of the middle and distal phalanx
of the fourth digit is seen with large lytic lesion and cortical erosion along 
the medial aspect of the middle phalanx and
medial base of the distal phalanx, suspicious for osteomyelitis" with likely 
exension into the joint. Pt is afebrile without leukocytosis and was given one 
dose of ceftaz/vanco in ED. 
 
Osteomyelitis and dry gangrene of the 4th digit in Right Lower Extremity: 
* Podiatry consulted; s/p Open I&D surgical intervention ; podiatry revision
for right foot osteomyelitis 
* Surgical procedure with podiatry: Open Incision and Drainage of the right foot
* Pt. open to rehab placement after surgical operation, awaiting placement for 
rehab
* Blood Cx.: Gram Negative Robel, Bacillus species, started on Unasyn 3G IV q6h
* pain mgmt
* Gross specimen grew GP cocci, scant GNR; continue IV Unasyn and await return 
to OR for podiatry follow up later this week; polymicrobial osteomyelitis (
Klebsiella, Diphtheroids, Alpha Strep, Staph Aureus) with concern for fourth 
metatarsal involvement, will likely require long term anbitiotic therapy with 
PICC line
* PICC placement; placed ; cont. Unasyn to complete 4 week course from last
debridement (until )
* Weekly ESR while on Unasyn, last ESR 84 ()
* US Duplex Arterial; no visualized arterial stenosis 
* Psych evaluation; awaiting placement at [facility] depending on psych consult and 
reassurance patient's psychiatric medications are appropriate
 
Diabetes Mellitus:
* RISS
* FS
* Patient needs diabetic education
* HBA1C
* Continue low dose Lisinopril
* Hold home regimen
 
Schizophrenia:
* Pt. stable upon admission, held his psych medications initially prior to 
confirming with [facility] & [facility] regarding 
patient's home medications; thereupon, we restarted patient's Risperidone upon 
completing an EKG (to rule out any QT Prolongation)
* Pt. is tolerating medications and has no active issues
Problem List:
 1. Osteomyelitis
 
Pain Ratin
Pain Location:
na
Pain Goal: Remain pain free
Pain Plan:
na
Tomorrow's Labs & Rationales:
routine
Consulting Request:
   Consulting Specialty: Infectious Disease
   Consulting Physician:
Dr. Crockett
   Reason for Consult: Infection, Osteomyelitis

## 2018-08-23 VITALS — DIASTOLIC BLOOD PRESSURE: 78 MMHG | SYSTOLIC BLOOD PRESSURE: 136 MMHG

## 2018-08-23 VITALS — SYSTOLIC BLOOD PRESSURE: 136 MMHG | DIASTOLIC BLOOD PRESSURE: 78 MMHG

## 2018-08-23 VITALS — DIASTOLIC BLOOD PRESSURE: 70 MMHG | SYSTOLIC BLOOD PRESSURE: 132 MMHG

## 2018-08-23 NOTE — PN- HOUSESTAFF
**See Addendum**
Subjective
Follow-up For:
Osteomyelitis
Subjective:
Afebrile overnight. Patient is seen and examined in bed. Patient is awake this 
morning and notes no complaints overnight and ready to go to [facility] once we get a 
bed available. Patient otherwise denies any pain in the legs. Patient also 
denies any n/v, diarrhea, constipation, chest pain, shortness of breath, fevers,
chills, and fatigue.
 
Review of Systems
Constitutional:
Reports: see HPI. 
 
Objective
Last 24 Hrs of Vital Signs/I&O
 Vital Signs
 
 
Date Time Temp Pulse Resp B/P B/P Pulse O2 O2 Flow FiO2
 
     Mean Ox Delivery Rate 
 
 0607 97.8 89 20 136/78  97 Room Air  
 
 2213 98.1 85 20 130/70  97 Room Air  
 
 1531 98.9 85 20 120/80  98   
 
 0821  78  120/78     
 
 0800      97 Room Air  
 
 
 Intake & Output
 
 
  0800  0000  1600
 
Intake Total  400 980
 
Output Total 300  200
 
Balance -300 400 780
 
    
 
Intake, Oral  400 980
 
Output, Urine 300  200
 
 
 
 
Physical Exam
General Appearance: Alert, Oriented X3, Cooperative, No Acute Distress
Skin: right foot s/p I&D
HEENT: Atraumatic
Neck: Supple, No JVD
Cardiovascular: Regular Rate, Normal S1, Normal S2
Lungs: Clear to Auscultation
Neurological: Normal Speech
Extremities: No Edema
 
Assessment/Plan
Assessment:
US-DUPLEX SCAN LOWER EXT ARTER - 
1. Normal velocities throughout the right lower extremity without evidence of
velocity shift. Therefore, no hemodynamically significant stenosis was
visualized.
 
MRI-RT FOOT W/O & W JOSE A - 
- Findings consistent with osteomyelitis involving the distal and middle
phalanges, as well as head of the proximal phalanx, of the fourth toe.
- Soft tissue edema/cellulitis without abscess.
- Hallux valgus and moderate osteoarthritis of great toe metatarsophalangeal
joint.
 
56 YO M with PMH of HTN, BPH, DM, bladder mass who comes in for CC of RLE pain. 
RLE xray in ED showed "Rarefaction of the bones of the middle and distal phalanx
of the fourth digit is seen with large lytic lesion and cortical erosion along 
the medial aspect of the middle phalanx and
medial base of the distal phalanx, suspicious for osteomyelitis" with likely 
exension into the joint. Pt is afebrile without leukocytosis and was given one 
dose of ceftaz/vanco in ED. 
 
Osteomyelitis and dry gangrene of the 4th digit in Right Lower Extremity: 
* Podiatry consulted; s/p Open I&D surgical intervention ; podiatry revision
for right foot osteomyelitis 
* Surgical procedure with podiatry: Open Incision and Drainage of the right foot
* Pt. open to rehab placement after surgical operation, awaiting placement for 
rehab
* Blood Cx.: Gram Negative Robel, Bacillus species, started on Unasyn 3G IV q6h
* pain mgmt
* Gross specimen grew GP cocci, scant GNR; continue IV Unasyn and await return 
to OR for podiatry follow up later this week; polymicrobial osteomyelitis (
Klebsiella, Diphtheroids, Alpha Strep, Staph Aureus) with concern for fourth 
metatarsal involvement, will likely require long term anbitiotic therapy with 
PICC line
* PICC placement; placed ; cont. Unasyn to complete 4 week course from last
debridement (until )
* Weekly ESR while on Unasyn, last ESR 84 (), next 
* US Duplex Arterial; no visualized arterial stenosis 
* Psych evaluation; awaiting placement at [facility] depending on psych consult and 
reassurance patient's psychiatric medications are appropriate; placement 
recieved and discharge 
 
Diabetes Mellitus:
* RISS
* FS
* Patient needs diabetic education
* HBA1C
* Continue low dose Lisinopril
* Hold home regimen
 
Schizophrenia:
* Pt. stable upon admission, held his psych medications initially prior to 
confirming with [facility] & [facility] regarding 
patient's home medications; thereupon, we restarted patient's Risperidone upon 
completing an EKG (to rule out any QT Prolongation)
* Pt. is tolerating medications and has no active issues
Problem List:
 1. Osteomyelitis
 
Pain Ratin
Pain Location:
na
Pain Goal: Remain pain free
Pain Plan:
na
Tomorrow's Labs & Rationales:
routine
Consulting Request:
   Consulting Specialty: Infectious Disease
   Consulting Physician:
Dr. Crockett
   Reason for Consult: Infection, Osteomyelitis

## 2025-03-17 NOTE — PN- HOUSESTAFF
**See Addendum**
Subjective
Follow-up For:
Osteomyelitis
Subjective:
Afebrile overnight. Patient is sleeping in bed comfortably this morning. Patient
mentions he has been having a foot ulcer of one of the digits in his right foot.
Patient scheduled for MRI of the foot which had findings consistent with 
osteomyelitis. Patient aware he is scheduled for a surgical operation tomorrow 
with podiatry to address his foot ulcer. Patient otherwise denies any chest pain
, fevers, chills, fatigue, n/v, and shortness of breath.
 
Review of Systems
Constitutional:
Reports: see HPI. 
 
Objective
Last 24 Hrs of Vital Signs/I&O
 Vital Signs
 
 
Date Time Temp Pulse Resp B/P B/P Pulse O2 O2 Flow FiO2
 
     Mean Ox Delivery Rate 
 
 1454 98.3 82 18 100/60  99   
 
 0853  80  120/78     
 
 0800       Room Air  
 
 0615 98.6 79 18 112/60  97 Room Air  
 
 2248 98.8 88 18 124/70  98 Room Air  
 
 
 Intake & Output
 
 
  1600  0800  0000
 
Intake Total  200 200
 
Output Total 500 1150 1000
 
Balance -500 -950 -800
 
    
 
Intake, Oral  200 200
 
Output, Urine 500 1150 1000
 
 
 
 
Physical Exam
General Appearance: Alert, Oriented X3, Cooperative, No Acute Distress
Skin: right foot, 4th digit ulcer 
HEENT: Atraumatic
Neck: Supple, No JVD
Cardiovascular: Regular Rate, Normal S1, Normal S2
Lungs: Clear to Auscultation
Abdomen: Soft, No Tenderness
Neurological: Normal Speech
Extremities: No Edema
 
Assessment/Plan
Assessment:
MRI-RT FOOT W/O & W JOSE A - 
- Findings consistent with osteomyelitis involving the distal and middle
phalanges, as well as head of the proximal phalanx, of the fourth toe.
- Soft tissue edema/cellulitis without abscess.
- Hallux valgus and moderate osteoarthritis of great toe metatarsophalangeal
joint.
 
56 YO M with PMH of HTN, BPH, DM, ?bladder mass who comes in for CC of RLE pain.
 RLE xray in ED showed "Rarefaction of the bones of the middle and distal 
phalanx of the fourth digit is seen with large lytic lesion and cortical erosion
along the medial aspect of the middle phalanx and
medial base of the distal phalanx, suspicious for osteomyelitis " with likely 
exension into the joint. Pt is afebrile without leukocytosis and was given one 
dose of ceftaz/vanco in ED. 
 
Osteomyelitis and dry gangrene of the 4th digit in Right Lower Extremity: 
* Podiatry consulted; will proceed with surgical intervention
* Given lack of fever, leukocytosis and hemodynamic instability will hold off 
abx hoping for culture results. However, he already got one dose of ABx in ED.
* NPO in midnight, surgical procedure with podiatry in AM
* Pt. open to Rehab placement after surgical operation
* Blood Cx.: Gram Negative Robel, awaiting identification
* pain mgmt
 
Diabetes Mellitus:
* RISS
* FS
* Patient needs diabetic education
* HBA1C
* Continue low dose Lisinopril
* Hold home regimen
 
Schizophrenia:
* Obtain psych med records
* Holdling off his all meds at this time
 
FC
Problem List:
 1. Osteomyelitis
 
Pain Ratin
Pain Location:
na
Pain Goal: Remain pain free
Pain Plan:
na
Tomorrow's Labs & Rationales:
na 4 = No assist / stand by assistance